# Patient Record
Sex: FEMALE | Race: WHITE | NOT HISPANIC OR LATINO | URBAN - METROPOLITAN AREA
[De-identification: names, ages, dates, MRNs, and addresses within clinical notes are randomized per-mention and may not be internally consistent; named-entity substitution may affect disease eponyms.]

---

## 2017-04-27 ENCOUNTER — EMERGENCY (EMERGENCY)
Facility: HOSPITAL | Age: 72
LOS: 1 days | Discharge: HOME | End: 2017-04-27
Admitting: INTERNAL MEDICINE
Payer: MEDICARE

## 2017-04-27 PROCEDURE — 94640 AIRWAY INHALATION TREATMENT: CPT

## 2017-04-27 PROCEDURE — 99284 EMERGENCY DEPT VISIT MOD MDM: CPT

## 2017-04-27 PROCEDURE — 99284 EMERGENCY DEPT VISIT MOD MDM: CPT | Mod: 25

## 2017-07-28 ENCOUNTER — EMERGENCY (EMERGENCY)
Facility: HOSPITAL | Age: 72
LOS: 1 days | Discharge: HOME | End: 2017-07-28
Admitting: EMERGENCY MEDICINE
Payer: MEDICARE

## 2017-07-28 PROCEDURE — 74176 CT ABD & PELVIS W/O CONTRAST: CPT | Mod: 26

## 2017-07-28 PROCEDURE — 99284 EMERGENCY DEPT VISIT MOD MDM: CPT

## 2017-07-29 PROCEDURE — 85027 COMPLETE CBC AUTOMATED: CPT

## 2017-07-29 PROCEDURE — 85730 THROMBOPLASTIN TIME PARTIAL: CPT

## 2017-07-29 PROCEDURE — 83690 ASSAY OF LIPASE: CPT

## 2017-07-29 PROCEDURE — 80053 COMPREHEN METABOLIC PANEL: CPT

## 2017-07-29 PROCEDURE — 81002 URINALYSIS NONAUTO W/O SCOPE: CPT

## 2017-07-29 PROCEDURE — 99284 EMERGENCY DEPT VISIT MOD MDM: CPT | Mod: 25

## 2017-07-29 PROCEDURE — 85610 PROTHROMBIN TIME: CPT

## 2017-07-29 PROCEDURE — 74176 CT ABD & PELVIS W/O CONTRAST: CPT

## 2017-07-29 PROCEDURE — 81001 URINALYSIS AUTO W/SCOPE: CPT

## 2017-07-29 PROCEDURE — 96374 THER/PROPH/DIAG INJ IV PUSH: CPT

## 2017-07-29 PROCEDURE — 81003 URINALYSIS AUTO W/O SCOPE: CPT

## 2018-01-17 ENCOUNTER — APPOINTMENT (OUTPATIENT)
Dept: ULTRASOUND IMAGING | Facility: HOSPITAL | Age: 73
End: 2018-01-17
Payer: MEDICARE

## 2018-01-17 ENCOUNTER — APPOINTMENT (OUTPATIENT)
Dept: MAMMOGRAPHY | Facility: HOSPITAL | Age: 73
End: 2018-01-17
Payer: MEDICARE

## 2018-01-17 ENCOUNTER — OUTPATIENT (OUTPATIENT)
Dept: OUTPATIENT SERVICES | Facility: HOSPITAL | Age: 73
LOS: 1 days | End: 2018-01-17
Payer: MEDICARE

## 2018-01-17 DIAGNOSIS — Z00.8 ENCOUNTER FOR OTHER GENERAL EXAMINATION: ICD-10-CM

## 2018-01-17 PROCEDURE — 77066 DX MAMMO INCL CAD BI: CPT

## 2018-01-17 PROCEDURE — 77066 DX MAMMO INCL CAD BI: CPT | Mod: 26

## 2018-01-17 PROCEDURE — 76641 ULTRASOUND BREAST COMPLETE: CPT

## 2018-01-17 PROCEDURE — G0279: CPT | Mod: 26

## 2018-01-17 PROCEDURE — G0279: CPT

## 2018-01-17 PROCEDURE — 76641 ULTRASOUND BREAST COMPLETE: CPT | Mod: 26

## 2018-01-28 ENCOUNTER — EMERGENCY (EMERGENCY)
Facility: HOSPITAL | Age: 73
LOS: 1 days | Discharge: HOME | End: 2018-01-28
Admitting: EMERGENCY MEDICINE
Payer: MEDICARE

## 2018-01-28 PROCEDURE — 99283 EMERGENCY DEPT VISIT LOW MDM: CPT

## 2018-01-28 PROCEDURE — 99282 EMERGENCY DEPT VISIT SF MDM: CPT

## 2018-02-10 ENCOUNTER — EMERGENCY (EMERGENCY)
Facility: HOSPITAL | Age: 73
LOS: 1 days | Discharge: HOME | End: 2018-02-10
Admitting: EMERGENCY MEDICINE
Payer: MEDICARE

## 2018-02-10 PROCEDURE — 99284 EMERGENCY DEPT VISIT MOD MDM: CPT | Mod: 25

## 2018-02-10 PROCEDURE — 93010 ELECTROCARDIOGRAM REPORT: CPT

## 2018-02-10 PROCEDURE — 99284 EMERGENCY DEPT VISIT MOD MDM: CPT

## 2018-02-10 PROCEDURE — 71250 CT THORAX DX C-: CPT

## 2018-02-10 PROCEDURE — 80048 BASIC METABOLIC PNL TOTAL CA: CPT

## 2018-02-10 PROCEDURE — 93005 ELECTROCARDIOGRAM TRACING: CPT

## 2018-02-10 PROCEDURE — 85730 THROMBOPLASTIN TIME PARTIAL: CPT

## 2018-02-10 PROCEDURE — 71250 CT THORAX DX C-: CPT | Mod: 26

## 2018-02-10 PROCEDURE — 85027 COMPLETE CBC AUTOMATED: CPT

## 2018-02-10 PROCEDURE — 85610 PROTHROMBIN TIME: CPT

## 2018-06-13 PROBLEM — Z78.9 SOCIAL ALCOHOL USE: Status: ACTIVE | Noted: 2018-06-13

## 2018-06-13 PROBLEM — Z83.3 FAMILY HISTORY OF DIABETES MELLITUS: Status: ACTIVE | Noted: 2018-06-13

## 2018-06-13 PROBLEM — Z82.49 FAMILY HISTORY OF HEART DISEASE: Status: ACTIVE | Noted: 2018-06-13

## 2018-06-13 PROBLEM — Z86.39 HISTORY OF HYPERCHOLESTEROLEMIA: Status: RESOLVED | Noted: 2018-06-13 | Resolved: 2018-06-13

## 2018-06-13 PROBLEM — S89.92XA: Status: RESOLVED | Noted: 2018-06-13 | Resolved: 2018-06-13

## 2018-06-13 PROBLEM — S60.219A: Status: ACTIVE | Noted: 2018-06-13

## 2018-06-19 ENCOUNTER — APPOINTMENT (OUTPATIENT)
Dept: ORTHOPEDIC SURGERY | Facility: CLINIC | Age: 73
End: 2018-06-19
Payer: COMMERCIAL

## 2018-06-19 VITALS
SYSTOLIC BLOOD PRESSURE: 134 MMHG | HEART RATE: 71 BPM | BODY MASS INDEX: 24.92 KG/M2 | WEIGHT: 146 LBS | HEIGHT: 64 IN | DIASTOLIC BLOOD PRESSURE: 90 MMHG

## 2018-06-19 DIAGNOSIS — Z83.3 FAMILY HISTORY OF DIABETES MELLITUS: ICD-10-CM

## 2018-06-19 DIAGNOSIS — S52.123A DISPLACED FRACTURE OF HEAD OF UNSPECIFIED RADIUS, INITIAL ENCOUNTER FOR CLOSED FRACTURE: ICD-10-CM

## 2018-06-19 DIAGNOSIS — S60.219A CONTUSION OF UNSPECIFIED WRIST, INITIAL ENCOUNTER: ICD-10-CM

## 2018-06-19 DIAGNOSIS — S89.92XA UNSPECIFIED INJURY OF LEFT LOWER LEG, INITIAL ENCOUNTER: ICD-10-CM

## 2018-06-19 DIAGNOSIS — Z78.9 OTHER SPECIFIED HEALTH STATUS: ICD-10-CM

## 2018-06-19 DIAGNOSIS — Z87.891 PERSONAL HISTORY OF NICOTINE DEPENDENCE: ICD-10-CM

## 2018-06-19 DIAGNOSIS — Z86.39 PERSONAL HISTORY OF OTHER ENDOCRINE, NUTRITIONAL AND METABOLIC DISEASE: ICD-10-CM

## 2018-06-19 DIAGNOSIS — Z82.49 FAMILY HISTORY OF ISCHEMIC HEART DISEASE AND OTHER DISEASES OF THE CIRCULATORY SYSTEM: ICD-10-CM

## 2018-06-19 PROCEDURE — 99214 OFFICE O/P EST MOD 30 MIN: CPT

## 2018-06-19 PROCEDURE — 73564 X-RAY EXAM KNEE 4 OR MORE: CPT | Mod: LT

## 2018-06-19 PROCEDURE — 73080 X-RAY EXAM OF ELBOW: CPT | Mod: RT

## 2018-07-17 ENCOUNTER — APPOINTMENT (OUTPATIENT)
Dept: ORTHOPEDIC SURGERY | Facility: CLINIC | Age: 73
End: 2018-07-17
Payer: COMMERCIAL

## 2018-07-17 VITALS — HEIGHT: 64.5 IN | WEIGHT: 146 LBS | BODY MASS INDEX: 24.62 KG/M2

## 2018-07-17 PROCEDURE — 99214 OFFICE O/P EST MOD 30 MIN: CPT

## 2018-10-29 ENCOUNTER — APPOINTMENT (OUTPATIENT)
Dept: ORTHOPEDIC SURGERY | Facility: CLINIC | Age: 73
End: 2018-10-29
Payer: COMMERCIAL

## 2018-10-29 VITALS — WEIGHT: 146 LBS | BODY MASS INDEX: 24.62 KG/M2 | HEIGHT: 64.5 IN

## 2018-10-29 DIAGNOSIS — M76.32 ILIOTIBIAL BAND SYNDROME, LEFT LEG: ICD-10-CM

## 2018-10-29 PROCEDURE — 99214 OFFICE O/P EST MOD 30 MIN: CPT | Mod: 25

## 2018-10-29 PROCEDURE — 20610 DRAIN/INJ JOINT/BURSA W/O US: CPT | Mod: 50

## 2019-03-13 ENCOUNTER — APPOINTMENT (OUTPATIENT)
Dept: MAMMOGRAPHY | Facility: HOSPITAL | Age: 74
End: 2019-03-13
Payer: COMMERCIAL

## 2019-03-13 ENCOUNTER — APPOINTMENT (OUTPATIENT)
Dept: ULTRASOUND IMAGING | Facility: HOSPITAL | Age: 74
End: 2019-03-13
Payer: COMMERCIAL

## 2019-03-13 ENCOUNTER — OUTPATIENT (OUTPATIENT)
Dept: OUTPATIENT SERVICES | Facility: HOSPITAL | Age: 74
LOS: 1 days | End: 2019-03-13
Payer: MEDICARE

## 2019-03-13 DIAGNOSIS — Z00.8 ENCOUNTER FOR OTHER GENERAL EXAMINATION: ICD-10-CM

## 2019-03-13 PROCEDURE — 77063 BREAST TOMOSYNTHESIS BI: CPT | Mod: 26

## 2019-03-13 PROCEDURE — 76641 ULTRASOUND BREAST COMPLETE: CPT | Mod: 26

## 2019-03-13 PROCEDURE — 77067 SCR MAMMO BI INCL CAD: CPT

## 2019-03-13 PROCEDURE — 77063 BREAST TOMOSYNTHESIS BI: CPT

## 2019-03-13 PROCEDURE — 77067 SCR MAMMO BI INCL CAD: CPT | Mod: 26

## 2019-03-13 PROCEDURE — 76641 ULTRASOUND BREAST COMPLETE: CPT

## 2019-03-26 ENCOUNTER — APPOINTMENT (OUTPATIENT)
Dept: OPHTHALMOLOGY | Facility: CLINIC | Age: 74
End: 2019-03-26
Payer: COMMERCIAL

## 2019-03-26 DIAGNOSIS — H53.10 UNSPECIFIED SUBJECTIVE VISUAL DISTURBANCES: ICD-10-CM

## 2019-03-26 PROCEDURE — 99204 OFFICE O/P NEW MOD 45 MIN: CPT

## 2019-03-26 PROCEDURE — 92083 EXTENDED VISUAL FIELD XM: CPT

## 2019-03-26 RX ORDER — ERGOCALCIFEROL 1.25 MG/1
1.25 MG CAPSULE, LIQUID FILLED ORAL
Qty: 4 | Refills: 0 | Status: ACTIVE | COMMUNITY
Start: 2019-02-13

## 2019-03-26 RX ORDER — FLUTICASONE PROPIONATE AND SALMETEROL XINAFOATE 115; 21 UG/1; UG/1
115-21 AEROSOL, METERED RESPIRATORY (INHALATION)
Qty: 36 | Refills: 0 | Status: ACTIVE | COMMUNITY
Start: 2019-02-04

## 2019-04-30 ENCOUNTER — APPOINTMENT (OUTPATIENT)
Dept: ORTHOPEDIC SURGERY | Facility: CLINIC | Age: 74
End: 2019-04-30

## 2019-05-29 ENCOUNTER — APPOINTMENT (OUTPATIENT)
Dept: ORTHOPEDIC SURGERY | Facility: CLINIC | Age: 74
End: 2019-05-29

## 2019-05-30 ENCOUNTER — APPOINTMENT (OUTPATIENT)
Dept: ORTHOPEDIC SURGERY | Facility: CLINIC | Age: 74
End: 2019-05-30
Payer: COMMERCIAL

## 2019-05-30 VITALS — SYSTOLIC BLOOD PRESSURE: 138 MMHG | HEART RATE: 69 BPM | DIASTOLIC BLOOD PRESSURE: 83 MMHG

## 2019-05-30 PROCEDURE — 73630 X-RAY EXAM OF FOOT: CPT | Mod: LT

## 2019-05-30 PROCEDURE — 99213 OFFICE O/P EST LOW 20 MIN: CPT

## 2019-05-31 NOTE — PHYSICAL EXAM
[de-identified] : Oriented to time, place, person\par Mood: Normal\par Affect: Normal\par Appearance: Healthy, well appearing, no acute distress.\par Gait: Normal\par Assistive Devices: Cane\par \par Right foot exam\par \par Skin: Clean, dry, intact\par Inspection: No obvious malalignment, no masses, fifth metatarsal swelling, no effusion, mild ecchymosis\par Pulses: 2+ DP/PT pulses\par ROM: FOOT limited eversion\par Painful ROM: No pain full range of motion ankle\par Tenderness: No tenderness over the medial/lateral malleolus, no CFL/ATFL/PTFL pain. No medial malleolus pain, no deltoid ligament pain. No heel pain. No Achilles tenderness. Positive 5th metatarsal pain. No pain to the LisFranc joint. No ttp over the posterior tibial tendon.\par Stability: Negative anterior/posterior drawer.\par Strength: 5/5 ADD/ABD/TA/GS/EHL/FHL/EDL\par Neuro: Sensation in tact to light touch throughout\par \par \par  [de-identified] : \par The following radiographs were ordered and read by me during this patients visit. I reviewed each radiograph in detail with the patient and discussed the findings as highlighted below. \par \par 3 views of the right foot were obtained today that show small avulsion fracture fifth metatarsal. There is no degenerative change seen. There is no gross malalignment. No significant other obvious osseous abnormality, otherwise unremarkable.

## 2019-05-31 NOTE — DISCUSSION/SUMMARY
[de-identified] : 73-year-old female with left fifth metatarsal avulsion fracture\par \par Point tenderness and swelling to the fifth metatarsal consistent with small avulsion fracture seen on x-ray imaging. Discussion was had with patient regarding these findings as well as treatment. Patient voiced understanding and agreement.\par \par Recommendation: We bearing as tolerated in the CAM boot as supplied x4wks. Decreased activity. Local eyes/NSAIDs p.r.n.\par \par Followup 1mo

## 2019-05-31 NOTE — HISTORY OF PRESENT ILLNESS
[de-identified] : 73 year old female presents today with left foot pain 5/27/19. She tripped over a rug and inverted her ankle. She has developed ecchymosis. The pain is constant worse with walking. Taking Tylenol for pain. Reports slight numbness in the bottom of her foot. \par \par The patient's past medical history, past surgical history, medications and allergies were reviewed by me today with the patient and documented accordingly. In addition, the patient's family and social history, which were noncontributory to this visit, were reviewed also.

## 2019-07-01 ENCOUNTER — APPOINTMENT (OUTPATIENT)
Dept: ORTHOPEDIC SURGERY | Facility: CLINIC | Age: 74
End: 2019-07-01
Payer: COMMERCIAL

## 2019-07-01 VITALS
HEART RATE: 72 BPM | BODY MASS INDEX: 24.45 KG/M2 | DIASTOLIC BLOOD PRESSURE: 78 MMHG | HEIGHT: 64.5 IN | WEIGHT: 145 LBS | SYSTOLIC BLOOD PRESSURE: 121 MMHG

## 2019-07-01 DIAGNOSIS — S92.352D DISPLACED FRACTURE OF FIFTH METATARSAL BONE, LEFT FOOT, SUBSEQUENT ENCOUNTER FOR FRACTURE WITH ROUTINE HEALING: ICD-10-CM

## 2019-07-01 PROCEDURE — 99213 OFFICE O/P EST LOW 20 MIN: CPT

## 2019-07-01 PROCEDURE — 73630 X-RAY EXAM OF FOOT: CPT | Mod: LT

## 2019-07-01 NOTE — PHYSICAL EXAM
[de-identified] : Oriented to time, place, person\par Mood: Normal\par Affect: Normal\par Appearance: Healthy, well appearing, no acute distress.\par Gait: Normal\par Assistive Devices: Cane\par \par Right foot exam\par \par Skin: Clean, dry, intact\par Inspection: No obvious malalignment, no masses, no swelling, no effusion, no ecchymosis\par Pulses: 2+ DP/PT pulses\par ROM: Full\par Painful ROM: No pain full range of motion ankle\par Tenderness: No tenderness over the medial/lateral malleolus, no CFL/ATFL/PTFL pain. No medial malleolus pain, no deltoid ligament pain. No heel pain. No Achilles tenderness. no 5th metatarsal pain. No pain to the LisFranc joint. No ttp over the posterior tibial tendon.\par Stability: Negative anterior/posterior drawer.\par Strength: 5/5 ADD/ABD/TA/GS/EHL/FHL/EDL\par Neuro: Sensation in tact to light touch throughout\par \par \par  [de-identified] : \par The following radiographs were ordered and read by me during this patients visit. I reviewed each radiograph in detail with the patient and discussed the findings as highlighted below. \par \par 3 views of the right foot were obtained today that show small healed avulsion fracture fifth metatarsal. There is no degenerative change seen. There is no gross malalignment. No significant other obvious osseous abnormality, otherwise unremarkable.

## 2019-07-01 NOTE — DISCUSSION/SUMMARY
[de-identified] : 74-year-old female with left fifth metatarsal avulsion fracture\par \par X-ray show improved positioning fifth metatarsal fracture. Small avulsion with local degenerative changes. Patient has minimal to no pain, with full range of motion. Recommendation at this time is for progression of weightbearing.\par \par Recommendation: Discontinue CAM -> regular supportive shoe wear, increase activity to tolerance. Ice/NSAIDs.\par \par Home exercise program provided. No formal PT required.\par \par Followup as needed

## 2019-12-24 ENCOUNTER — APPOINTMENT (OUTPATIENT)
Dept: ORTHOPEDIC SURGERY | Facility: CLINIC | Age: 74
End: 2019-12-24
Payer: COMMERCIAL

## 2019-12-24 DIAGNOSIS — S80.02XA CONTUSION OF LEFT KNEE, INITIAL ENCOUNTER: ICD-10-CM

## 2019-12-24 PROCEDURE — 99212 OFFICE O/P EST SF 10 MIN: CPT

## 2019-12-24 PROCEDURE — 73564 X-RAY EXAM KNEE 4 OR MORE: CPT | Mod: LT

## 2019-12-24 NOTE — PHYSICAL EXAM
[de-identified] : Patient is WDWN, alert, and in no acute distress. Breathing is unlabored. She is grossly oriented to person, place, and time. \par \par Left knee: There is tenderness to palpation medially along the MCL. There is localized swelling and ecchymosis. No valgus or valgus instability present on provocative testing. Flexion and extension 5/5.\par Range of motion: Active flexion and extension full. No crepitus.\par Tests and Signs: All tests for stability are normal. \par Strength: flexion and extension 5/5  [de-identified] : AP, lateral, skyline, and tunnel views of the left knee were obtained and revealed no acute fracture or dislocation. There is medial compartment narrowing consistent with osteoarthritis.

## 2019-12-24 NOTE — ADDENDUM
[FreeTextEntry1] : I, Jory Limon wrote this note acting as a scribe for Dr. Chon Rolon on Dec 24, 2019.

## 2019-12-24 NOTE — HISTORY OF PRESENT ILLNESS
[de-identified] : Patient is a 74 year old female who returns c/o left knee pain. She fell ten days ago on 12/14/2019 and landed directly onto the left knee with it an inverted and flexed position. There was immediate pain followed by development of ecchymosis. She was evaluated at [facility] later that same day where x-rays were taken and read as negative for acute fracture or dislocation. Since then, she has been with residual pain in the knee, primarily over the medial aspect. The knee is tender to the touch. She has intermittent pain when she plants the leg.

## 2019-12-24 NOTE — DISCUSSION/SUMMARY
[de-identified] : Walking and physical activity were encouraged. \par Topical analgesics as needed. \par NSAIDs as tolerated. \par An MRI of the left knee may be ordered to evaluate for MCL tear if her symptoms persist or worsen. \par Follow up in 2 weeks as needed.

## 2019-12-24 NOTE — END OF VISIT
[FreeTextEntry3] : I, Chon Rolon MD, ordering physician, have read and attest that all the information, medical decision making and discharge instructions within are true and accurate.

## 2019-12-27 ENCOUNTER — EMERGENCY (EMERGENCY)
Facility: HOSPITAL | Age: 74
LOS: 1 days | Discharge: HOME | End: 2019-12-27
Attending: EMERGENCY MEDICINE | Admitting: INTERNAL MEDICINE
Payer: COMMERCIAL

## 2019-12-27 VITALS
DIASTOLIC BLOOD PRESSURE: 84 MMHG | HEIGHT: 64 IN | HEART RATE: 90 BPM | WEIGHT: 141.98 LBS | TEMPERATURE: 98 F | OXYGEN SATURATION: 97 % | SYSTOLIC BLOOD PRESSURE: 139 MMHG | RESPIRATION RATE: 17 BRPM

## 2019-12-27 PROCEDURE — 99285 EMERGENCY DEPT VISIT HI MDM: CPT | Mod: 25

## 2019-12-27 PROCEDURE — 94640 AIRWAY INHALATION TREATMENT: CPT

## 2019-12-27 PROCEDURE — 99284 EMERGENCY DEPT VISIT MOD MDM: CPT

## 2019-12-27 RX ORDER — IPRATROPIUM/ALBUTEROL SULFATE 18-103MCG
3 AEROSOL WITH ADAPTER (GRAM) INHALATION
Refills: 0 | Status: COMPLETED | OUTPATIENT
Start: 2019-12-27 | End: 2019-12-27

## 2019-12-27 RX ADMIN — Medication 3 MILLILITER(S): at 14:15

## 2019-12-27 RX ADMIN — Medication 3 MILLILITER(S): at 14:38

## 2019-12-27 RX ADMIN — Medication 50 MILLIGRAM(S): at 13:54

## 2019-12-27 RX ADMIN — Medication 3 MILLILITER(S): at 13:56

## 2019-12-27 NOTE — ED ADULT NURSE NOTE - NSIMPLEMENTINTERV_GEN_ALL_ED
Implemented All Fall Risk Interventions:  [place] to call system. Call bell, personal items and telephone within reach. Instruct patient to call for assistance. Room bathroom lighting operational. Non-slip footwear when patient is off stretcher. Physically safe environment: no spills, clutter or unnecessary equipment. Stretcher in lowest position, wheels locked, appropriate side rails in place. Provide visual cue, wrist band, yellow gown, etc. Monitor gait and stability. Monitor for mental status changes and reorient to person, place, and time. Review medications for side effects contributing to fall risk. Reinforce activity limits and safety measures with patient and family.

## 2019-12-27 NOTE — ED PROVIDER NOTE - CLINICAL SUMMARY MEDICAL DECISION MAKING FREE TEXT BOX
75 y/o F with PMH of right lung cancer s/p partial lobectomy 2 yrs ago, asthma, occasional sinusitis presents to the ED with c/o post nasal drip, facial pain x 4 days, similar to her typical sinusitis. Reports she had a URI 1.5 weeks ago, which is improved. She has zpak at home for her sinusitis, which she started 2 days ago. Reports she had mild SOB and wheezing today she did a neb tx at home with some relief, but states she wanted to come to the ER before her asthma escalated. States she recently had a chest CT which was "normal". Denies f/c, CP, n/v, prolonged immobilization, abd pain, sick contacts or all other complaints. PE as noted above, no signs of resp distress on exam. duoneb tx given. Prednisone given. Patient states she feels better, she is stable for dc with PCP f/u

## 2019-12-27 NOTE — ED ADULT TRIAGE NOTE - CHIEF COMPLAINT QUOTE
Pt c/o cold for 1 week, sinus infection for 3 days, today with worsening shortness of breath, nebulizer treatment x 1/2 hour

## 2019-12-27 NOTE — ED PROVIDER NOTE - RESPIRATORY WHEEZES
mild scattered diffuse wheezes. no tripoding, use of accessory muscles or signs of resp distress noted/DIFFUSE

## 2019-12-27 NOTE — ED PROVIDER NOTE - NSFOLLOWUPINSTRUCTIONS_ED_ALL_ED_FT
Follow up with your primary care physician within 2-3 days   Take the prescribed medication as directed   Stay hydrated  Return to the ER if your symptoms worsen or for any other medical emergencies  ***********    Asthma    Asthma is a condition in which the airways tighten and narrow, making it difficult to breath. Asthma episodes, also called asthma attacks, range from minor to life-threatening. Symptoms include wheezing, coughing, chest tightness, or shortness of breath. The diagnosis of asthma is made by a review of your medical history and a physical exam, but may involve additional testing. Asthma cannot be cured, but medicines and lifestyle changes can help control it. Avoid triggers of asthma which may include animal dander, pollen, mold, smoke, air pollutants, etc.     SEEK IMMEDIATE MEDICAL CARE IF YOU HAVE ANY OF THE FOLLOWING SYMPTOMS: worsening of symptoms, shortness of breath at rest, chest pain, bluish discoloration to lips or fingertips, lightheadedness/dizziness, or fever. Follow up with your primary care physician within 2-3 days   Take the prescribed medication as directed. Finish your azithromycin.   Stay hydrated  Return to the ER if your symptoms worsen or for any other medical emergencies  ***********    Asthma    Asthma is a condition in which the airways tighten and narrow, making it difficult to breath. Asthma episodes, also called asthma attacks, range from minor to life-threatening. Symptoms include wheezing, coughing, chest tightness, or shortness of breath. The diagnosis of asthma is made by a review of your medical history and a physical exam, but may involve additional testing. Asthma cannot be cured, but medicines and lifestyle changes can help control it. Avoid triggers of asthma which may include animal dander, pollen, mold, smoke, air pollutants, etc.     SEEK IMMEDIATE MEDICAL CARE IF YOU HAVE ANY OF THE FOLLOWING SYMPTOMS: worsening of symptoms, shortness of breath at rest, chest pain, bluish discoloration to lips or fingertips, lightheadedness/dizziness, or fever.

## 2019-12-27 NOTE — ED PROVIDER NOTE - ATTENDING CONTRIBUTION TO CARE
I personally evaluated the patient. I reviewed the Resident’s or Physician Assistant’s note (as assigned above), and agree with the findings and plan except as documented in my note.  Patient had sinus symptoms- now has wheezing    PE: slight exp wheezing    clear after duoneb    start prednisone

## 2019-12-27 NOTE — ED ADULT NURSE NOTE - CHPI ED NUR SYMPTOMS NEG
no chest pain/no fever/no body aches/no chills/no edema/no headache/no wheezing/no diaphoresis/no hemoptysis

## 2019-12-27 NOTE — ED ADULT NURSE NOTE - OBJECTIVE STATEMENT
Pt arrived to the ER c/o asthma. Pt reports that she had a cold x1 week that turned into a sinus infection and has been taking azithromycin. Pt states PMH of asthma and reported some difficulty breathing. Pt report productive cough with yellow/ green mucus. Pt denies any nausea, vomiting, fever, or chills. Pt denies any chest pain or trouble breathing at this time.

## 2019-12-27 NOTE — ED PROVIDER NOTE - CARE PLAN
Principal Discharge DX:	Mild asthma, unspecified whether complicated, unspecified whether persistent

## 2019-12-27 NOTE — ED PROVIDER NOTE - OBJECTIVE STATEMENT
73 y/o F with PMH of right lung cancer s/p partial lobectomy 2 yrs ago, asthma, occasional sinusitis presents to the ED with c/o post nasal drip, facial pain x 4 days, similar to her typical sinusitis. Reports she had a URI 1.5 weeks ago, which is improved. She has zpak at home for her sinusitis, which she started 2 days ago. Reports she had mild SOB and wheezing today she took d 73 y/o F with PMH of right lung cancer s/p partial lobectomy 2 yrs ago, asthma, occasional sinusitis presents to the ED with c/o post nasal drip, facial pain x 4 days, similar to her typical sinusitis. Reports she had a URI 1.5 weeks ago, which is improved. She has zpak at home for her sinusitis, which she started 2 days ago. Reports she had mild SOB and wheezing today she did a neb tx at home with some relief, but states she wanted to come to the ER before her asthma escalated. States she recently had a chest CT which was "normal". Denies f/c, CP, n/v, prolonged immobilization, abd pain, sick contacts or all other complaints   WALDO Looney

## 2019-12-27 NOTE — ED PROVIDER NOTE - PATIENT PORTAL LINK FT
You can access the FollowMyHealth Patient Portal offered by [facility] by registering at the following website: [email or web address]. By joining [employer] [employer]’s FollowMyHealth portal, you will also be able to view your health information using other applications (apps) compatible with our system.

## 2020-01-01 DIAGNOSIS — R06.02 SHORTNESS OF BREATH: ICD-10-CM

## 2020-01-30 PROBLEM — C34.91 MALIGNANT NEOPLASM OF UNSPECIFIED PART OF RIGHT BRONCHUS OR LUNG: Chronic | Status: ACTIVE | Noted: 2019-12-27

## 2020-01-30 PROBLEM — J45.909 UNSPECIFIED ASTHMA, UNCOMPLICATED: Chronic | Status: ACTIVE | Noted: 2019-12-27

## 2020-03-19 ENCOUNTER — APPOINTMENT (OUTPATIENT)
Dept: ULTRASOUND IMAGING | Facility: HOSPITAL | Age: 75
End: 2020-03-19

## 2020-03-19 ENCOUNTER — APPOINTMENT (OUTPATIENT)
Dept: MAMMOGRAPHY | Facility: HOSPITAL | Age: 75
End: 2020-03-19

## 2020-04-14 NOTE — ED ADULT NURSE NOTE - NEURO WDL
Alert and oriented to person, place and time, memory intact, behavior appropriate to situation, PERRL. 14-Apr-2020 16:48

## 2020-07-16 NOTE — ED PROVIDER NOTE - PMH
Walk in Asthma, unspecified asthma severity, unspecified whether complicated, unspecified whether persistent    Malignant neoplasm of right lung, unspecified part of lung [employer]

## 2020-08-04 ENCOUNTER — APPOINTMENT (OUTPATIENT)
Dept: MAMMOGRAPHY | Facility: HOSPITAL | Age: 75
End: 2020-08-04
Payer: COMMERCIAL

## 2020-08-04 ENCOUNTER — APPOINTMENT (OUTPATIENT)
Dept: ULTRASOUND IMAGING | Facility: HOSPITAL | Age: 75
End: 2020-08-04
Payer: COMMERCIAL

## 2020-08-04 ENCOUNTER — OUTPATIENT (OUTPATIENT)
Dept: OUTPATIENT SERVICES | Facility: HOSPITAL | Age: 75
LOS: 1 days | End: 2020-08-04
Payer: COMMERCIAL

## 2020-08-04 DIAGNOSIS — Z00.8 ENCOUNTER FOR OTHER GENERAL EXAMINATION: ICD-10-CM

## 2020-08-04 PROCEDURE — 76641 ULTRASOUND BREAST COMPLETE: CPT | Mod: 26,50

## 2020-08-04 PROCEDURE — 77063 BREAST TOMOSYNTHESIS BI: CPT

## 2020-08-04 PROCEDURE — 77065 DX MAMMO INCL CAD UNI: CPT | Mod: 26,GG,RT

## 2020-08-04 PROCEDURE — 77067 SCR MAMMO BI INCL CAD: CPT | Mod: 26,59

## 2020-08-04 PROCEDURE — 77063 BREAST TOMOSYNTHESIS BI: CPT | Mod: 26,59

## 2020-08-04 PROCEDURE — 76641 ULTRASOUND BREAST COMPLETE: CPT

## 2020-08-04 PROCEDURE — 77067 SCR MAMMO BI INCL CAD: CPT

## 2020-08-04 PROCEDURE — 77065 DX MAMMO INCL CAD UNI: CPT

## 2020-10-13 ENCOUNTER — APPOINTMENT (OUTPATIENT)
Dept: ORTHOPEDIC SURGERY | Facility: CLINIC | Age: 75
End: 2020-10-13
Payer: MEDICARE

## 2020-10-13 PROCEDURE — 20610 DRAIN/INJ JOINT/BURSA W/O US: CPT | Mod: LT

## 2020-10-13 PROCEDURE — 99214 OFFICE O/P EST MOD 30 MIN: CPT | Mod: 25

## 2020-10-13 PROCEDURE — 73564 X-RAY EXAM KNEE 4 OR MORE: CPT | Mod: 50

## 2020-10-13 NOTE — DISCUSSION/SUMMARY
[de-identified] : Dx. Bursitis \par \par The patient wishes to proceed with a cortisone injection at this time. The skin was prepped with Betadine and alcohol and sprayed with Ethyl Chloride. An injection of 4 cc 1% Lidocaine without epinephrine, 1.0 cc Kenalog 40 mg, and 0.5 cc Dexamethasone was administered into the left knee along the area of maximum tenderness/pes anserina. The patient tolerated the procedure well. She will rest and apply ice. \par \par Walking and physical activity were encouraged. \par Topical analgesics as needed. \par NSAIDs as tolerated. \par An MRI of the left knee may be ordered to evaluate for MCL tear if her symptoms persist or worsen. \par Follow up in 6 weeks as needed.

## 2020-10-13 NOTE — ADDENDUM
[FreeTextEntry1] : I, Misty Cross wrote this note acting as a scribe for Dr. Chon Rolon on Oct 13, 2020.

## 2020-10-13 NOTE — HISTORY OF PRESENT ILLNESS
[de-identified] : Patient is a 74 year old female who returns c/o left knee pain. She fell ten days ago on 12/14/2019 and landed directly onto the left knee with it an inverted and flexed position. There was immediate pain followed by development of ecchymosis. She was evaluated at [facility] later that same day where x-rays were taken and read as negative for acute fracture or dislocation. Since then, she has been with residual pain in the knee, primarily over the medial aspect. The knee is tender to the touch. She has intermittent pain when she plants the leg. She returns stating that the pain in her left knee is getting worse. The pain is in the medial side of the knee.

## 2020-10-13 NOTE — PHYSICAL EXAM
[de-identified] : Patient is WDWN, alert, and in no acute distress. Breathing is unlabored. She is grossly oriented to person, place, and time. \par \par Left knee: There is tenderness to palpation medially along the proximal medial tibia in the area of the pes anserina. There is localized swelling . No  joint tenderness.No valgus or valgus instability present on provocative testing. Flexion and extension 5/5.\par Range of motion: Active flexion and extension full. No crepitus.\par Tests and Signs: All tests for stability are normal. \par Strength: flexion and extension 5/5  [de-identified] : \par \par AP, lateral, skyline, and oblique views of the bilateral knees were obtained and revealed mild  arthritis. \par

## 2020-10-15 ENCOUNTER — APPOINTMENT (OUTPATIENT)
Dept: ORTHOPEDIC SURGERY | Facility: CLINIC | Age: 75
End: 2020-10-15
Payer: MEDICARE

## 2020-10-15 DIAGNOSIS — M25.551 PAIN IN RIGHT HIP: ICD-10-CM

## 2020-10-15 PROCEDURE — 73564 X-RAY EXAM KNEE 4 OR MORE: CPT | Mod: RT

## 2020-10-15 PROCEDURE — 99213 OFFICE O/P EST LOW 20 MIN: CPT

## 2020-10-15 PROCEDURE — 73502 X-RAY EXAM HIP UNI 2-3 VIEWS: CPT | Mod: RT

## 2020-10-15 RX ORDER — ALBUTEROL SULFATE 2.5 MG/3ML
(2.5 MG/3ML) SOLUTION RESPIRATORY (INHALATION)
Qty: 810 | Refills: 0 | Status: ACTIVE | COMMUNITY
Start: 2020-07-07

## 2020-10-15 RX ORDER — ALBUTEROL SULFATE 90 UG/1
108 (90 BASE) AEROSOL, METERED RESPIRATORY (INHALATION)
Qty: 54 | Refills: 0 | Status: ACTIVE | COMMUNITY
Start: 2020-03-25

## 2020-10-15 NOTE — ADDENDUM
[FreeTextEntry1] : I, Misty Cross wrote this note acting as a scribe for Dr. Chon Rolon on Oct 15, 2020.

## 2020-10-15 NOTE — DISCUSSION/SUMMARY
[de-identified] : The patient was reassured that she does not have a fracture on today's Xrays\par Walking and physical activity were encouraged. \par Range of motion and strengthening exercises were reviewed. \par Topical analgesics as needed. \par Patient is advised to use a heating pad. \par Follow Up in 1 weeks if pain is not improved

## 2020-10-15 NOTE — PHYSICAL EXAM
[de-identified] : Patient is WDWN, alert, and in no acute distress. Breathing is unlabored. She is grossly oriented to person, place, and time. \par Right thigh tenderness posteriorly\par Patient is able to flex and extend her right hip and knee without difficulty. [de-identified] : \par \par AP views of the pelvis and lateral of the right hip were obtained and revealed no fracture.There is a small  bone island in the right proximal femoral canal.. \par \par AP, lateral, skyline, and oblique views of the right knee were obtained and revealed mild arthritis. No evidence of a fracture. \par  \par  \par

## 2020-10-26 ENCOUNTER — APPOINTMENT (OUTPATIENT)
Dept: ORTHOPEDIC SURGERY | Facility: CLINIC | Age: 75
End: 2020-10-26
Payer: MEDICARE

## 2020-10-26 DIAGNOSIS — M25.561 PAIN IN RIGHT KNEE: ICD-10-CM

## 2020-10-26 DIAGNOSIS — S82.143A DISPLACED BICONDYLAR FRACTURE OF UNSPECIFIED TIBIA, INITIAL ENCOUNTER FOR CLOSED FRACTURE: ICD-10-CM

## 2020-10-26 PROCEDURE — 99214 OFFICE O/P EST MOD 30 MIN: CPT

## 2020-10-27 ENCOUNTER — OUTPATIENT (OUTPATIENT)
Dept: OUTPATIENT SERVICES | Facility: HOSPITAL | Age: 75
LOS: 1 days | End: 2020-10-27
Payer: MEDICARE

## 2020-10-27 ENCOUNTER — TRANSCRIPTION ENCOUNTER (OUTPATIENT)
Age: 75
End: 2020-10-27

## 2020-10-27 ENCOUNTER — APPOINTMENT (OUTPATIENT)
Dept: MRI IMAGING | Facility: HOSPITAL | Age: 75
End: 2020-10-27
Payer: MEDICARE

## 2020-10-27 DIAGNOSIS — S82.143A DISPLACED BICONDYLAR FRACTURE OF UNSPECIFIED TIBIA, INITIAL ENCOUNTER FOR CLOSED FRACTURE: ICD-10-CM

## 2020-10-27 DIAGNOSIS — M25.561 PAIN IN RIGHT KNEE: ICD-10-CM

## 2020-10-27 PROCEDURE — 73721 MRI JNT OF LWR EXTRE W/O DYE: CPT

## 2020-10-27 PROCEDURE — 73721 MRI JNT OF LWR EXTRE W/O DYE: CPT | Mod: 26,RT

## 2020-10-27 NOTE — PHYSICAL EXAM
[de-identified] : Patient is WDWN, alert, and in no acute distress. Breathing is unlabored. She is grossly oriented to person, place, and time. \par \par Right thigh tenderness posteriorly\par Patient is able to flex and extend her right hip and knee without difficulty. [de-identified] : AP views of the pelvis and lateral of the right hip were obtained and revealed no fracture.There is a small  bone island in the right proximal femoral canal.. \par \par AP, lateral, skyline, and oblique views of the right knee were obtained and revealed mild arthritis. There is a linear lucency along the lateral plateau articular surface which may be a nondisplaced  lateral tibia plateau  fracture. \par  \par  \par

## 2020-10-27 NOTE — ADDENDUM
[FreeTextEntry1] : I, Misty Cross wrote this note acting as a scribe for Dr. Chon Rolon on Oct 26, 2020.  No bruits; no thyromegaly or nodules

## 2020-10-27 NOTE — HISTORY OF PRESENT ILLNESS
[de-identified] : Patient is a 74 year old female who returns c/o right knee pain. She fell ten days ago on 12/14/2019 and landed directly onto the right knee with it an inverted and flexed position. There was immediate pain followed by development of ecchymosis. She was evaluated at [facility] later that same day where x-rays were taken and read as negative for acute fracture or dislocation. Since then, she has been with residual pain in the knee, primarily over the medial aspect. The knee is tender to the touch. She has intermittent pain when she plants the leg. She returns stating that the pain in her right knee is getting worse. The pain is in the medial side of the knee. She returns after falling the previous night 10/14/2020 and experiencing pain in her right knee. She states that the pain occurs  when changing positions and with  WB. She reports that she took a Tylenol this morning. Denies pain with pressure. She is ambulating with a walker. She returns stating that she still has pain on the lateral side of her knee. She states that she has pain with WB and movement.

## 2020-10-27 NOTE — DISCUSSION/SUMMARY
[de-identified] : An MRI of the right knee was ordered to evaluate for occult fracture. Patient will follow-up to review the results. \par Walking and physical activity were encouraged. \par Range of motion and strengthening exercises were reviewed. \par Topical analgesics as needed. \par Patient is advised to use a heating pad. \par

## 2020-11-03 ENCOUNTER — APPOINTMENT (OUTPATIENT)
Dept: ORTHOPEDIC SURGERY | Facility: CLINIC | Age: 75
End: 2020-11-03

## 2020-11-17 ENCOUNTER — APPOINTMENT (OUTPATIENT)
Dept: ORTHOPEDIC SURGERY | Facility: CLINIC | Age: 75
End: 2020-11-17
Payer: MEDICARE

## 2020-11-17 DIAGNOSIS — S70.11XD CONTUSION OF RIGHT THIGH, SUBSEQUENT ENCOUNTER: ICD-10-CM

## 2020-11-17 PROCEDURE — 73552 X-RAY EXAM OF FEMUR 2/>: CPT | Mod: RT

## 2020-11-17 PROCEDURE — 99214 OFFICE O/P EST MOD 30 MIN: CPT

## 2020-11-17 NOTE — ADDENDUM
[FreeTextEntry1] : I, Misty Cross wrote this note acting as a scribe for Dr. Chon Rolon on Nov 17, 2020.

## 2020-11-17 NOTE — DISCUSSION/SUMMARY
[de-identified] : An MRI of the right femur was ordered to evaluate for calcific density in the proximal third femur canal. Patient will follow-up to review the results. \par Walking and physical activity were encouraged. \par Range of motion and strengthening exercises were reviewed. \par Topical analgesics as needed. \par NSAIDs as tolerated. \par

## 2020-11-17 NOTE — HISTORY OF PRESENT ILLNESS
[de-identified] : Patient is a 74 year old female who returns c/o right knee pain. She fell ten days ago on 12/14/2019 and landed directly onto the right knee with it an inverted and flexed position. There was immediate pain followed by development of ecchymosis. She was evaluated at [facility] later that same day where x-rays were taken and read as negative for acute fracture or dislocation. Since then, she has been with residual pain in the knee, primarily over the medial aspect. The knee is tender to the touch. She has intermittent pain when she plants the leg. She returns stating that the pain in her right knee is getting worse. The pain is in the medial side of the knee. She returns after falling the previous night 10/14/2020 and experiencing pain in her right knee. She states that the pain occurs  when changing positions and with  WB. She reports that she took a Tylenol this morning. Denies pain with pressure. She is ambulating with a walker. She returns stating that she still has pain on the lateral side of her knee. She states that she has pain with WB and movement. \par She returns c/o of right femur pain. She states that she was in bed changing positions when she felt the pain on 11/11/2020. She states the pain lasted more than 10 minutes. Denies pain when WB. She states she has noticed numbness on the plantar side of the right foot. Reports taking Advil 200mg, she states that she had to take 8 200mg Asdvils in a day.

## 2020-11-17 NOTE — PHYSICAL EXAM
[de-identified] : Patient is WDWN, alert, and in no acute distress. Breathing is unlabored. She is grossly oriented to person, place, and time. \par \par Right thigh tenderness posteriorly\par Patient is able to flex and extend her right hip and knee without difficulty. [de-identified] : AP views of the pelvis and lateral of the right hip were obtained and revealed no fracture.There is a small  bone island in the right proximal femoral canal.. \par \par AP, lateral, skyline, and oblique views of the right knee were obtained and revealed mild arthritis. There is a linear lucency along the lateral plateau articular surface which may be a nondisplaced  lateral tibia plateau  fracture. \par  \par AP, lateral and oblique views of the right femur were obtained and revealed no acute fracture. No dislocation. Cartilage spaces are maintained. There is a calcific deposit in the proximal femur canal. There is evidence of arthritis in the knee. \par  \par

## 2020-11-18 ENCOUNTER — APPOINTMENT (OUTPATIENT)
Dept: MRI IMAGING | Facility: HOSPITAL | Age: 75
End: 2020-11-18
Payer: MEDICARE

## 2020-11-18 ENCOUNTER — OUTPATIENT (OUTPATIENT)
Dept: OUTPATIENT SERVICES | Facility: HOSPITAL | Age: 75
LOS: 1 days | End: 2020-11-18
Payer: MEDICARE

## 2020-11-18 ENCOUNTER — RESULT REVIEW (OUTPATIENT)
Age: 75
End: 2020-11-18
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
## 2020-11-18 DIAGNOSIS — M79.651 PAIN IN RIGHT THIGH: ICD-10-CM

## 2020-11-18 DIAGNOSIS — S70.11XD CONTUSION OF RIGHT THIGH, SUBSEQUENT ENCOUNTER: ICD-10-CM

## 2020-11-18 PROCEDURE — 73718 MRI LOWER EXTREMITY W/O DYE: CPT | Mod: 26,RT

## 2020-11-18 PROCEDURE — 73718 MRI LOWER EXTREMITY W/O DYE: CPT

## 2020-11-24 ENCOUNTER — APPOINTMENT (OUTPATIENT)
Dept: ORTHOPEDIC SURGERY | Facility: CLINIC | Age: 75
End: 2020-11-24

## 2021-02-23 ENCOUNTER — APPOINTMENT (OUTPATIENT)
Dept: ORTHOPEDIC SURGERY | Facility: CLINIC | Age: 76
End: 2021-02-23
Payer: COMMERCIAL

## 2021-02-23 DIAGNOSIS — M17.11 UNILATERAL PRIMARY OSTEOARTHRITIS, RIGHT KNEE: ICD-10-CM

## 2021-02-23 PROCEDURE — 99072 ADDL SUPL MATRL&STAF TM PHE: CPT

## 2021-02-23 PROCEDURE — 99214 OFFICE O/P EST MOD 30 MIN: CPT

## 2021-02-23 PROCEDURE — 73564 X-RAY EXAM KNEE 4 OR MORE: CPT | Mod: RT

## 2021-02-23 NOTE — ADDENDUM
[FreeTextEntry1] : I, Cheo Juan R wrote this note acting as a scribe for Dr. Chon Rolon MD on Feb 23, 2021.\par \par All medical record entries made by the Scribe were at my,  Dr. Chon Rolon MD., direction and personally dictated by me on 02/23/2021. I have personally reviewed the chart and agree that the record accurately reflects my personal performance of the history, physical exam, assessment and plan.

## 2021-02-23 NOTE — DISCUSSION/SUMMARY
[de-identified] : The underlying pathophysiology was reviewed with the patient. XR films were reviewed with the patient. Discussed at length the nature of the patient’s condition. Their right knee symptoms appear secondary to mild degenerative arthritis, calcium deposits\par \par Treatment options were discussed including; observation, NSAIDS, analgesics, bracing, injection(s), physical therapy.\par \par The patient wishes to proceed with physical therapy. A script was given.\par Advil or Aleve prn 	\par Recommend OTC Voltaren gel.\par \par Patient can continue activities as tolerated. All questions answered, understanding verbalized. Patient in agreement with plan of care. Patient may follow up prn.

## 2021-02-23 NOTE — PHYSICAL EXAM
[de-identified] : Patient is WDWN, alert, and in no acute distress. Breathing is unlabored. She is grossly oriented to person, place, and time. \par \par Right knee: There is no medial/lateral joint line tenderness to palpation. No swelling, no valgus or valgus instability \par present on provocative testing. \par Range of motion: Active flexion and extension are full and painless. No crepitus.\par Tests and Signs: All tests for stability are normal. \par Strength: flexion and extension 5/5		  [de-identified] : Right knee xrays, standing AP/Lateral and Merchant films, and 45 degree PA standing view, taken at the office today shows diffuse tricompartmental degenerative arthritis, medial joint space narrowing, marginal osteophytes, sclerosis, patellofemoral joint space narrowing, peripheral osteophytes.

## 2021-02-23 NOTE — HISTORY OF PRESENT ILLNESS
[de-identified] : JESÚS POST is a 75 year female presents for follow-up evaluation of right knee pain. She reports pain in the morning, walking down the stairs, pivoting motion. She would like PT. She has been doing home exercises but does not think she is benefiting. She has bee taking Advil occasionally for the pain.

## 2021-03-13 ENCOUNTER — EMERGENCY (EMERGENCY)
Facility: HOSPITAL | Age: 76
LOS: 1 days | Discharge: HOME | End: 2021-03-13
Attending: EMERGENCY MEDICINE | Admitting: EMERGENCY MEDICINE
Payer: MEDICARE

## 2021-03-13 VITALS
HEART RATE: 89 BPM | SYSTOLIC BLOOD PRESSURE: 158 MMHG | WEIGHT: 139.99 LBS | DIASTOLIC BLOOD PRESSURE: 90 MMHG | HEIGHT: 64 IN | RESPIRATION RATE: 16 BRPM | TEMPERATURE: 97 F | OXYGEN SATURATION: 95 %

## 2021-03-13 PROCEDURE — 73590 X-RAY EXAM OF LOWER LEG: CPT | Mod: 26,RT

## 2021-03-13 PROCEDURE — 73590 X-RAY EXAM OF LOWER LEG: CPT

## 2021-03-13 PROCEDURE — 12002 RPR S/N/AX/GEN/TRNK2.6-7.5CM: CPT

## 2021-03-13 PROCEDURE — 99283 EMERGENCY DEPT VISIT LOW MDM: CPT | Mod: 25

## 2021-03-13 RX ORDER — ACETAMINOPHEN 500 MG
650 TABLET ORAL ONCE
Refills: 0 | Status: COMPLETED | OUTPATIENT
Start: 2021-03-13 | End: 2021-03-13

## 2021-03-13 RX ADMIN — Medication 650 MILLIGRAM(S): at 16:38

## 2021-03-13 NOTE — ED PROCEDURE NOTE - ATTENDING CONTRIBUTION TO CARE
Dr. Escalona: I performed a face to face bedside interview with patient regarding history of present illness, review of symptoms and past medical history. I completed an independent physical exam.  I have discussed patient's plan of care with PA.   I agree with note as stated above, having amended the EMR as needed to reflect my findings.   This includes HISTORY OF PRESENT ILLNESS, HIV, PAST MEDICAL/SURGICAL/FAMILY/SOCIAL HISTORY, ALLERGIES AND HOME MEDICATIONS, REVIEW OF SYSTEMS, PHYSICAL EXAM, and any PROGRESS NOTES during the time I functioned as the attending physician for this patient.

## 2021-03-13 NOTE — ED PROVIDER NOTE - PMH
Asthma, unspecified asthma severity, unspecified whether complicated, unspecified whether persistent    Malignant neoplasm of right lung, unspecified part of lung

## 2021-03-13 NOTE — ED ADULT NURSE NOTE - OBJECTIVE STATEMENT
Patient presents to ED with complaints of right shin laceration after walking into  door. Patient ambulatory, denies other injuries at this time.

## 2021-03-13 NOTE — ED PROVIDER NOTE - IV ALTEPLASE EXCL ABS HIDDEN
October 16, 2018                   [facility]  Pediatrics  [address]  [place] [address]  Phone: [phone]  Fax: [phone]   October 16, 2018     Patient: Monet Charles   YOB: 2010   Date of Visit: 10/16/2018       To Whom it May Concern:    Monet Charles was seen in my clinic on 10/16/2018. She may return to school on 10/17/18.    If you have any questions or concerns, please don't hesitate to call.    Sincerely,         Aury Harley MD     
show

## 2021-03-13 NOTE — ED PROVIDER NOTE - OBJECTIVE STATEMENT
75F h/o lung ca in the past, tdap utd, bumped her right lower leg into the edge of a  prior to arrival today and sustained a lac. No other injuries. Able to ambulate.

## 2021-03-13 NOTE — ED PROVIDER NOTE - PATIENT PORTAL LINK FT
You can access the FollowMyHealth Patient Portal offered by [facility] by registering at the following website: [email or web address]. By joining [employer]’s FollowMyHealth portal, you will also be able to view your health information using other applications (apps) compatible with our system.

## 2021-03-13 NOTE — ED PROVIDER NOTE - ATTENDING CONTRIBUTION TO CARE
Dr. Escalona: I performed a face to face bedside interview with patient regarding history of present illness, review of symptoms and past medical history. I completed an independent physical exam.  I have discussed patient's plan of care with PA.   I agree with note as stated above, having amended the EMR as needed to reflect my findings.   This includes HISTORY OF PRESENT ILLNESS, HIV, PAST MEDICAL/SURGICAL/FAMILY/SOCIAL HISTORY, ALLERGIES AND HOME MEDICATIONS, REVIEW OF SYSTEMS, PHYSICAL EXAM, and any PROGRESS NOTES during the time I functioned as the attending physician for this patient.    Dr. Escalona: This H&P has been written by myself in its entirety

## 2021-03-13 NOTE — ED PROVIDER NOTE - PROGRESS NOTE DETAILS
APRIL Kumari: Seen pt with Dr. Escalona. Agree with above HPI, PE, a/p. sutures placed, pt tolerated with no complaints. xray reviewed. Pt stable for dc and fu in 7 days for suture removal.

## 2021-03-21 ENCOUNTER — EMERGENCY (EMERGENCY)
Facility: HOSPITAL | Age: 76
LOS: 1 days | Discharge: HOME | End: 2021-03-21
Attending: EMERGENCY MEDICINE | Admitting: EMERGENCY MEDICINE
Payer: MEDICARE

## 2021-03-21 VITALS
TEMPERATURE: 98 F | OXYGEN SATURATION: 98 % | WEIGHT: 138.01 LBS | HEIGHT: 64 IN | SYSTOLIC BLOOD PRESSURE: 144 MMHG | HEART RATE: 74 BPM | RESPIRATION RATE: 18 BRPM | DIASTOLIC BLOOD PRESSURE: 80 MMHG

## 2021-03-21 PROCEDURE — L9995: CPT

## 2021-03-21 PROCEDURE — G0463: CPT

## 2021-03-21 NOTE — ED PROVIDER NOTE - CLINICAL SUMMARY MEDICAL DECISION MAKING FREE TEXT BOX
pt with right lower leg laceration, sutured 1 week ago. no pain, no d/c no fever no chills, healing. presents for suture removal    4 removed, 2 left

## 2021-03-21 NOTE — ED PROVIDER NOTE - NSFOLLOWUPINSTRUCTIONS_ED_ALL_ED_FT
Stitches Removal    WHAT YOU NEED TO KNOW:    What do I need to know about stitches removal? Stitches are usually removed within 14 days, depending on the location of the wound. Your healthcare provider will tell you when to return to have your stitches removed. Your provider will use sterile forceps or tweezers to  the knot of each stitch. He or she will cut the stitch with scissors and pull the stitch out. You may feel a slight tug as the stitch comes out.    What can I do to care for the area after the stitches are removed?   •Do not pull medical tape off. Your provider may place small strips of medical tape across your wound after the stitches have been removed. These strips will peel and fall of on their own. Do not pull them off.      •Clean the area as directed. Carefully wash the area with soap and water. Pat the area dry with a clean towel. Check the area for signs of infection, such as redness, swelling, or pus. Also check that the wound is not coming apart.      •Protect your wound. Your wound can swell, bleed, or split open if it is stretched or bumped. You may need to wear a bandage that supports your wound until it is completely healed.      •Care for a scar. You may have a scar after the stitches are removed. Use sunblock if the area is exposed to the sun. Apply it every day after the stitches are removed. This will help prevent skin discoloration. Talk to your healthcare provider about medicines you can use to make the scar less visible. Some medicines are available without a prescription.      When should I seek immediate care?   •Your wound splits open or is starting to come apart.      •You suddenly cannot move your injured joint.      •You have sudden numbness around your wound.      •You see red streaks coming from your wound.      When should I contact my healthcare provider?   •You have a fever and chills.      •Your wound is red, warm, swollen, or leaking pus.      •There is a bad smell coming from your wound.      •You have increased pain in the wound area.      •You have questions or concerns about your condition or care.      CARE AGREEMENT:    You have the right to help plan your care. Learn about your health condition and how it may be treated. Discuss treatment options with your healthcare providers to decide what care you want to receive. You always have the right to refuse treatment.     Return to ER for suture removal in 3 days.  Return if fever, worse pain, swelling or other symptoms.

## 2021-03-21 NOTE — ED PROVIDER NOTE - OBJECTIVE STATEMENT
pt with right lower leg laceration, sutured 1 week ago. no pain, no d/c no fever no chills, healing. presents for suture removal

## 2021-03-21 NOTE — ED PROVIDER NOTE - PROGRESS NOTE DETAILS
2 sutures left in place  [place] going to [place] 3[place], will go to er/urg care in [place] for removal on 3/23

## 2021-03-21 NOTE — ED PROVIDER NOTE - PATIENT PORTAL LINK FT
You can access the FollowMyHealth Patient Portal offered by [facility] by registering at the following website: [email or web address]. By joining [employer]’s FollowMyHealth portal, you will also be able to view your health information using other applications (apps) compatible with our system.

## 2021-08-05 ENCOUNTER — APPOINTMENT (OUTPATIENT)
Dept: ULTRASOUND IMAGING | Facility: HOSPITAL | Age: 76
End: 2021-08-05
Payer: MEDICARE

## 2021-08-05 ENCOUNTER — OUTPATIENT (OUTPATIENT)
Dept: OUTPATIENT SERVICES | Facility: HOSPITAL | Age: 76
LOS: 1 days | End: 2021-08-05
Payer: MEDICARE

## 2021-08-05 ENCOUNTER — APPOINTMENT (OUTPATIENT)
Dept: MAMMOGRAPHY | Facility: HOSPITAL | Age: 76
End: 2021-08-05
Payer: MEDICARE

## 2021-08-05 DIAGNOSIS — Z00.8 ENCOUNTER FOR OTHER GENERAL EXAMINATION: ICD-10-CM

## 2021-08-05 PROCEDURE — 76641 ULTRASOUND BREAST COMPLETE: CPT

## 2021-08-05 PROCEDURE — 77063 BREAST TOMOSYNTHESIS BI: CPT

## 2021-08-05 PROCEDURE — 77067 SCR MAMMO BI INCL CAD: CPT

## 2021-08-05 PROCEDURE — 76641 ULTRASOUND BREAST COMPLETE: CPT | Mod: 26,50

## 2021-08-05 PROCEDURE — 77063 BREAST TOMOSYNTHESIS BI: CPT | Mod: 26

## 2021-08-05 PROCEDURE — 77067 SCR MAMMO BI INCL CAD: CPT | Mod: 26

## 2021-09-22 ENCOUNTER — EMERGENCY (EMERGENCY)
Facility: HOSPITAL | Age: 76
LOS: 1 days | Discharge: HOME | End: 2021-09-22
Attending: INTERNAL MEDICINE | Admitting: INTERNAL MEDICINE
Payer: MEDICARE

## 2021-09-22 VITALS
DIASTOLIC BLOOD PRESSURE: 80 MMHG | RESPIRATION RATE: 16 BRPM | TEMPERATURE: 98 F | SYSTOLIC BLOOD PRESSURE: 149 MMHG | OXYGEN SATURATION: 97 % | WEIGHT: 138.89 LBS | HEIGHT: 64 IN | HEART RATE: 90 BPM

## 2021-09-22 PROCEDURE — 99283 EMERGENCY DEPT VISIT LOW MDM: CPT | Mod: 25

## 2021-09-22 PROCEDURE — 12002 RPR S/N/AX/GEN/TRNK2.6-7.5CM: CPT

## 2021-09-22 RX ORDER — CEPHALEXIN 500 MG
1 CAPSULE ORAL
Qty: 14 | Refills: 0
Start: 2021-09-22 | End: 2021-09-28

## 2021-09-22 RX ORDER — ACETAMINOPHEN 500 MG
650 TABLET ORAL ONCE
Refills: 0 | Status: COMPLETED | OUTPATIENT
Start: 2021-09-22 | End: 2021-09-22

## 2021-09-22 RX ORDER — CEPHALEXIN 500 MG
500 CAPSULE ORAL ONCE
Refills: 0 | Status: COMPLETED | OUTPATIENT
Start: 2021-09-22 | End: 2021-09-22

## 2021-09-22 RX ADMIN — Medication 500 MILLIGRAM(S): at 12:55

## 2021-09-22 RX ADMIN — Medication 650 MILLIGRAM(S): at 12:05

## 2021-09-22 NOTE — ED PROVIDER NOTE - ATTENDING CONTRIBUTION TO CARE
pt 76y f c/o c/o lac to right LE after bumping her leg into a  corner. same  thing happened 6 months ago. tdap utd. pt worried because she ended up with cellulitis and wants abx  denies numbness, tingling, difficulty walking, weakness. no other injuries. wants tylenol. didn't take any meds at home  Skin:  RLE: anterior shin 3.5 cm jagged laceration through skin. no bleeding  Ext:   no deformities, from, sensation intact, soft compartments, cap refill <3  lac repairs. abx and dc home  Dr. Ryan:  I have reviewed and discussed with the PA/ resident the case specifics, including the history, physical assessment, evaluation, conclusion, laboratory results, and medical plan. I agree with the contents, and conclusions. I have personally examined, and interviewed the patient.

## 2021-09-22 NOTE — CHART NOTE - NSCHARTNOTEFT_GEN_A_CORE
SW met with patient at bedside to assess for social work needs and to complete home assessment of safety.  Patient is a 76 year old female presenting to ED with a laceration.  Patient reports that she lives with her spouse. Patient reports that she cut her leg on the corner of her .  Patient denies any other recent falls and reports that she is normally independent.  Patient reports that she does not use any DME to assist with ambulating.  Patient reports that she is independent with her ADLs.  Patient reports that her spouse is currently in [place] but can assist as needed once arriving back.  Patient confirms she has other support if needed, but feels confident that she will be safe at home.  SW offered to schedule PMD follow up, but patient declined stating she will come back for suture removal.  Patient denied any safety concerns.  Patient denied needing any home based/community based referrals.    Patient cell - [phone]

## 2021-09-22 NOTE — ED ADULT NURSE NOTE - NSICDXPASTMEDICALHX_GEN_ALL_CORE_FT
PAST MEDICAL HISTORY:  Asthma, unspecified asthma severity, unspecified whether complicated, unspecified whether persistent     Malignant neoplasm of right lung, unspecified part of lung

## 2021-09-22 NOTE — ED PROVIDER NOTE - PATIENT PORTAL LINK FT
You can access the FollowMyHealth Patient Portal offered by [facility] by registering at the following website: [email or web address]. By joining [employer]’s FollowMyHealth portal, you will also be able to view your health information using other applications (apps) compatible with our system.

## 2021-09-22 NOTE — ED PROVIDER NOTE - PHYSICAL EXAMINATION
Gen: Well appearing in NAD  Head: NC/AT  Neck: trachea midline  Resp:  No distress  Neuro:  A&O appears non focal  Skin:  3.5 cm jagged laceration through skin. no bleeding  Psych:  Normal affect and mood   Ext:   no deformities, from, sensation intact, soft compartments, cap refill <3  Neuro: AAOx3, no sensory/motor deficits Gen: Well appearing in NAD  Head: NC/AT  Neck: trachea midline  Resp:  No distress  Neuro:  A&O appears non focal  Skin:  RLE: anterior shin 3.5 cm jagged laceration through skin. no bleeding  Psych:  Normal affect and mood   Ext:   no deformities, from, sensation intact, soft compartments, cap refill <3  Neuro: AAOx3, no sensory/motor deficits

## 2021-09-22 NOTE — ED PROVIDER NOTE - OBJECTIVE STATEMENT
pt 76y f c/o c/o lac to right LE after bumping her leg into a  corner. same  thing happened 6 months ago. tdap utd. pt worried because she ended up with cellulitis and wants abx  denies numbness, tingling, difficulty walking, weakness. no other injuries. wants tylenol. didn't take any meds at home

## 2021-09-22 NOTE — ED PROVIDER NOTE - CLINICAL SUMMARY MEDICAL DECISION MAKING FREE TEXT BOX
pt 76y f c/o c/o lac to right LE after bumping her leg into a  corner. same  thing happened 6 months ago. tdap utd. pt worried because she ended up with cellulitis and wants abx  denies numbness, tingling, difficulty walking, weakness. no other injuries. wants tylenol. didn't take any meds at home  Skin:  3.5 cm jagged laceration through skin. no bleeding  Ext:   no deformities, from, sensation intact, soft compartments, cap refill <3  lac repairs. abx and dc home pt 76y f c/o c/o lac to right LE after bumping her leg into a  corner. same  thing happened 6 months ago. tdap utd. pt worried because she ended up with cellulitis and wants abx  denies numbness, tingling, difficulty walking, weakness. no other injuries. wants tylenol. didn't take any meds at home  Skin:  RLE: anterior shin 3.5 cm jagged laceration through skin. no bleeding  Ext:   no deformities, from, sensation intact, soft compartments, cap refill <3  lac repairs. abx and dc home

## 2021-09-22 NOTE — ED PROVIDER NOTE - NSFOLLOWUPINSTRUCTIONS_ED_ALL_ED_FT
Laceration    A laceration is a cut that goes through all of the layers of the skin and into the tissue that is right under the skin. Some lacerations heal on their own. Others need to be closed with skin adhesive strips, skin glue, stitches (sutures), or staples. Proper laceration care minimizes the risk of infection and helps the laceration to heal better.  If non-absorbable stitches or staples have been placed, they must be taken out within the time frame instructed by your healthcare provider.    SEEK IMMEDIATE MEDICAL CARE IF YOU HAVE ANY OF THE FOLLOWING SYMPTOMS: swelling around the wound, worsening pain, drainage from the wound, red streaking going away from your wound, inability to move finger or toe near the laceration, or discoloration of skin near the laceration.       Follow up with your primary care provider within 48-72 hours for wound check. Keep sutures covered and dry for 24 hours then clean with soap and water daily.  Apply bacitracin and cover.  Return to ED for suture removal 10 days. Any increased pain, redness, streaking (red lines), swelling, fever, chills return to ER.

## 2021-09-27 NOTE — CHART NOTE - NSCHARTNOTEFT_GEN_A_CORE
Pt presented to ED on 9/22/21 with lacerations. SW placed call to pt to discuss and assist with follow up care. No answer, unable to leave message as mailbox was full. Chart reviewed, on day of ED visit, SW offered to schedule appointment with PCP, pt declined and stated she would return to ED for suture removal.

## 2021-10-02 ENCOUNTER — EMERGENCY (EMERGENCY)
Facility: HOSPITAL | Age: 76
LOS: 1 days | Discharge: HOME | End: 2021-10-02
Attending: EMERGENCY MEDICINE | Admitting: EMERGENCY MEDICINE
Payer: MEDICARE

## 2021-10-02 VITALS
WEIGHT: 139.55 LBS | HEIGHT: 64 IN | RESPIRATION RATE: 16 BRPM | DIASTOLIC BLOOD PRESSURE: 82 MMHG | TEMPERATURE: 98 F | SYSTOLIC BLOOD PRESSURE: 132 MMHG | HEART RATE: 83 BPM | OXYGEN SATURATION: 97 %

## 2021-10-02 PROCEDURE — L9995: CPT

## 2021-10-02 PROCEDURE — 99281 EMR DPT VST MAYX REQ PHY/QHP: CPT

## 2021-10-02 NOTE — ED PROVIDER NOTE - PATIENT PORTAL LINK FT
You can access the FollowMyHealth Patient Portal offered by [facility] by registering at the following website: [email or web address]. By joining [employer] [facility] [employer] [facility]’s FollowMyHealth portal, you will also be able to view your health information using other applications (apps) compatible with our system.

## 2021-10-02 NOTE — ED PROVIDER NOTE - ATTENDING CONTRIBUTION TO CARE
Dr. Escalona: I performed a face to face bedside interview with patient regarding history of present illness, review of symptoms and past medical history. I completed an independent physical exam.  I have discussed patient's plan of care with student. I agree with note as stated above, having amended the EMR as needed to reflect my findings.   This includes HISTORY OF PRESENT ILLNESS, HIV, PAST MEDICAL/SURGICAL/FAMILY/SOCIAL HISTORY, ALLERGIES AND HOME MEDICATIONS, REVIEW OF SYSTEMS, PHYSICAL EXAM, and any PROGRESS NOTES during the time I functioned as the attending physician for this patient.    Dr. Escalona: This H&P has been written by myself in its entirety

## 2021-10-02 NOTE — ED ADULT TRIAGE NOTE - IDEAL BODY WEIGHT(KG)
Pt states that he has had chest pressure for the past couple of  Days. Today he has been belching a lot  And that brought him in . He has had a heart attack in 2015, and had a stent placed.  
55

## 2021-10-02 NOTE — ED PROVIDER NOTE - SKIN COLOR
sutures in place right distal anterior c/d/i with minimal incisional erythema, no warmth, non tender, no drainage

## 2021-10-02 NOTE — ED PROVIDER NOTE - OBJECTIVE STATEMENT
75yo  female presents for suture removal. States 10 days ago she struck her right shin against the corner of the , lacerating her shin. Had sutures placed here and is here for removal only. No other complaints. Has been compliant w abx and tetanus was UTD. Dr. Escalona: 76F presents for suture removal. States 10 days ago she struck her right shin against the corner of the , lacerating her shin. Had sutures placed here and is here for removal only. No other complaints. Has been compliant w abx and tetanus was UTD.

## 2021-10-02 NOTE — ED PROVIDER NOTE - NSFOLLOWUPINSTRUCTIONS_ED_ALL_ED_FT
Stitches Removal    WHAT YOU NEED TO KNOW:    Stitches are usually removed within 14 days, depending on the location of the wound. Your healthcare provider will tell you when to return to have your stitches removed. Your provider will use sterile forceps or tweezers to  the knot of each stitch. He or she will cut the stitch with scissors and pull the stitch out. You may feel a slight tug as the stitch comes out.    DISCHARGE INSTRUCTIONS:    Seek care immediately if:   •Your wound splits open or is starting to come apart.      •You suddenly cannot move your injured joint.      •You have sudden numbness around your wound.      •You see red streaks coming from your wound.      Contact your healthcare provider if:   •You have a fever and chills.      •Your wound is red, warm, swollen, or leaking pus.      •There is a bad smell coming from your wound.      •You have increased pain in the wound area.      •You have questions or concerns about your condition or care.      Care for the area after the stitches are removed:   •Do not pull medical tape off. Your provider may place small strips of medical tape across your wound after the stitches have been removed. These strips will peel and fall of on their own. Do not pull them off.      •Clean the area as directed. Carefully wash the area with soap and water. Pat the area dry with a clean towel. Check the area for signs of infection, such as redness, swelling, or pus. Also check that the wound is not coming apart.      •Protect your wound. Your wound can swell, bleed, or split open if it is stretched or bumped. You may need to wear a bandage that supports your wound until it is completely healed.      •Care for a scar. You may have a scar after the stitches are removed. Use sunblock if the area is exposed to the sun. Apply it every day after the stitches are removed. This will help prevent skin discoloration. Talk to your healthcare provider about medicines you can use to make the scar less visible. Some medicines are available without a prescription.      Follow up with your healthcare provider as directed: You may need to return in 3 to 5 days if the stitches are on your face. Stitches on your scalp need to be removed after 7 to 14 days. Stitches over joints may remain in place up to 14 days. Write down your questions so you remember to ask them during your visits.

## 2022-01-06 ENCOUNTER — OUTPATIENT (OUTPATIENT)
Dept: OUTPATIENT SERVICES | Facility: HOSPITAL | Age: 77
LOS: 1 days | End: 2022-01-06
Payer: MEDICARE

## 2022-01-06 DIAGNOSIS — Z20.828 CONTACT WITH AND (SUSPECTED) EXPOSURE TO OTHER VIRAL COMMUNICABLE DISEASES: ICD-10-CM
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
## 2022-01-06 PROCEDURE — U0003: CPT

## 2022-01-06 PROCEDURE — U0005: CPT

## 2022-05-10 ENCOUNTER — APPOINTMENT (OUTPATIENT)
Dept: ORTHOPEDIC SURGERY | Facility: CLINIC | Age: 77
End: 2022-05-10
Payer: MEDICARE

## 2022-05-10 DIAGNOSIS — M17.12 UNILATERAL PRIMARY OSTEOARTHRITIS, LEFT KNEE: ICD-10-CM

## 2022-05-10 DIAGNOSIS — S49.91XA UNSPECIFIED INJURY OF RIGHT SHOULDER AND UPPER ARM, INITIAL ENCOUNTER: ICD-10-CM

## 2022-05-10 PROCEDURE — 99213 OFFICE O/P EST LOW 20 MIN: CPT | Mod: 25

## 2022-05-10 PROCEDURE — 73564 X-RAY EXAM KNEE 4 OR MORE: CPT | Mod: LT

## 2022-05-10 PROCEDURE — 20610 DRAIN/INJ JOINT/BURSA W/O US: CPT | Mod: 59,RT

## 2022-05-10 PROCEDURE — 73030 X-RAY EXAM OF SHOULDER: CPT | Mod: RT

## 2022-05-16 NOTE — DISCUSSION/SUMMARY
[de-identified] : The underlying pathophysiology was reviewed with the patient. XR films were reviewed with the patient. Discussed at length the nature of the patient’s condition. The right shoulder symptoms appear secondary to bursitis/tendinitis. The left knee symptoms are secondary to osteoarthritis.\par \par With regard to the right shoulder, I have recommended a cortisone injection. She may also use topical analgesics and oral antiinflammatories as needed. \par The patient wishes to proceed with a cortisone injection today. Under sterile precautions, an injection of 5cc 1% lidocaine without epinephrine and 0.5cc Kenalog 40mg, 0.5cc Dexamethasone was administered into the RIGHT posterior subacromial joint space. The patient tolerated the procedure well. Rest and apply ice. \par \par With regard to the left knee, we discussed treatment options of a cortisone injection, as well as use of topical and oral antiinflammatories.\par The patient wishes to proceed with a cortisone injection today. Under sterile precautions, an injection of 5cc 1% lidocaine without epinephrine and 0.5cc Kenalog 40mg, 0.5cc Dexamethasone was administered into the LEFT knee. The patient tolerated the procedure well. \par \par All questions answered, understanding verbalized. Patient in agreement with plan of care. Follow up as needed.

## 2022-05-16 NOTE — HISTORY OF PRESENT ILLNESS
[de-identified] : Patient is a 76 year old female who presents with complaints of right shoulder pain which began about 2 to 3 weeks ago at which time she was gardening. She reports to have been pulling on her hose and doing other work outdoors which aggravated the shoulder. She initially thought she may have sprained it but the pain has persisted. She has full motion to the shoulder with some pain. She has been taking Ibuprofen for pain as needed.\par \par She additionally has a history of left knee pain which dates back years ago secondary to an injury. She states this occasionally flares up and is at times treated with cortisone injections. She would like a cortisone injection today.

## 2022-05-16 NOTE — PHYSICAL EXAM
[de-identified] : Patient is WDWN, alert, and in no acute distress. Breathing is unlabored. She is grossly oriented to person, place, and time.\par \par Left knee:\par There is tenderness to palpation medially along the MCL. There is localized swelling and ecchymosis. No valgus or valgus instability present on provocative testing. Flexion and extension 5/5.\par Range of motion: Active flexion and extension full. No crepitus.\par Tests and Signs: All tests for stability are normal. \par Strength: flexion and extension 5/5 \par \par Right Shoulder:\par Inspection/ Palpation: there is no tenderness, swelling, or deformities. \par Range of Motion: active flexion, passive flexion, abduction, external rotation is full but with pain in all planes. No crepitus.\par Strength: forward elevation, internal rotation, external rotation, adduction, and abduction are 5/5. \par Stability: no joint instability on provocative testing.  [de-identified] : AP, transcapula, and axillary views of the rightshoulder were obtained and revealed no abnormalities. No acute fracture. No dislocation. Cartilage spaces are maintained. \par \par AP, lateral, skyline, and tunnel views of the right knee were obtained and revealed mild osteoarthritis of the lateral compartment.

## 2022-05-16 NOTE — ADDENDUM
[FreeTextEntry1] : I, Skyla Buchanan wrote this note acting as a scribe for Dr. Chon Rolon on May 10, 2022.

## 2022-05-16 NOTE — END OF VISIT
[FreeTextEntry3] : All medical record entries made by the Scribe were at my,  Dr. Chon Rolon MD., direction and personally dictated by me on 05/10/2022. I have personally reviewed the chart and agree that the record accurately reflects my personal performance of the history, physical exam, assessment and plan.

## 2022-06-08 NOTE — ED PROVIDER NOTE - INTERNATIONAL TRAVEL
The patient has a diagnosis of morbid obesity.  She has not lost any weight since her last office visit in April 2022.  She was encouraged to increase her exercise, decrease her caloric intake, and reduce her weight.   No

## 2022-06-18 ENCOUNTER — EMERGENCY (EMERGENCY)
Facility: HOSPITAL | Age: 77
LOS: 1 days | Discharge: HOME | End: 2022-06-18
Attending: STUDENT IN AN ORGANIZED HEALTH CARE EDUCATION/TRAINING PROGRAM | Admitting: STUDENT IN AN ORGANIZED HEALTH CARE EDUCATION/TRAINING PROGRAM
Payer: MEDICARE

## 2022-06-18 VITALS
WEIGHT: 128.97 LBS | OXYGEN SATURATION: 96 % | TEMPERATURE: 98 F | SYSTOLIC BLOOD PRESSURE: 153 MMHG | DIASTOLIC BLOOD PRESSURE: 71 MMHG | HEIGHT: 64 IN | RESPIRATION RATE: 18 BRPM | HEART RATE: 70 BPM

## 2022-06-18 VITALS
SYSTOLIC BLOOD PRESSURE: 153 MMHG | OXYGEN SATURATION: 96 % | DIASTOLIC BLOOD PRESSURE: 68 MMHG | HEART RATE: 70 BPM | TEMPERATURE: 98 F | RESPIRATION RATE: 19 BRPM

## 2022-06-18 LAB
B PERT DNA SPEC QL NAA+PROBE: SIGNIFICANT CHANGE UP
C PNEUM DNA SPEC QL NAA+PROBE: SIGNIFICANT CHANGE UP
FLUAV H1 2009 PAND RNA SPEC QL NAA+PROBE: SIGNIFICANT CHANGE UP
FLUAV H1 RNA SPEC QL NAA+PROBE: SIGNIFICANT CHANGE UP
FLUAV H3 RNA SPEC QL NAA+PROBE: SIGNIFICANT CHANGE UP
FLUAV SUBTYP SPEC NAA+PROBE: SIGNIFICANT CHANGE UP
FLUBV RNA SPEC QL NAA+PROBE: SIGNIFICANT CHANGE UP
HADV DNA SPEC QL NAA+PROBE: SIGNIFICANT CHANGE UP
HCOV PNL SPEC NAA+PROBE: SIGNIFICANT CHANGE UP
HMPV RNA SPEC QL NAA+PROBE: SIGNIFICANT CHANGE UP
HPIV1 RNA SPEC QL NAA+PROBE: SIGNIFICANT CHANGE UP
HPIV2 RNA SPEC QL NAA+PROBE: SIGNIFICANT CHANGE UP
HPIV3 RNA SPEC QL NAA+PROBE: SIGNIFICANT CHANGE UP
HPIV4 RNA SPEC QL NAA+PROBE: SIGNIFICANT CHANGE UP
RAPID RVP RESULT: DETECTED
RV+EV RNA SPEC QL NAA+PROBE: DETECTED
SARS-COV-2 RNA SPEC QL NAA+PROBE: SIGNIFICANT CHANGE UP

## 2022-06-18 PROCEDURE — 71046 X-RAY EXAM CHEST 2 VIEWS: CPT | Mod: 26

## 2022-06-18 PROCEDURE — 99284 EMERGENCY DEPT VISIT MOD MDM: CPT

## 2022-06-18 PROCEDURE — 94640 AIRWAY INHALATION TREATMENT: CPT

## 2022-06-18 PROCEDURE — 71046 X-RAY EXAM CHEST 2 VIEWS: CPT

## 2022-06-18 PROCEDURE — 0225U NFCT DS DNA&RNA 21 SARSCOV2: CPT

## 2022-06-18 PROCEDURE — 99285 EMERGENCY DEPT VISIT HI MDM: CPT | Mod: 25

## 2022-06-18 RX ORDER — IPRATROPIUM BROMIDE 0.2 MG/ML
500 SOLUTION, NON-ORAL INHALATION
Refills: 0 | Status: COMPLETED | OUTPATIENT
Start: 2022-06-18 | End: 2022-06-18

## 2022-06-18 RX ORDER — ALBUTEROL 90 UG/1
2.5 AEROSOL, METERED ORAL
Refills: 0 | Status: COMPLETED | OUTPATIENT
Start: 2022-06-18 | End: 2022-06-18

## 2022-06-18 RX ADMIN — ALBUTEROL 2.5 MILLIGRAM(S): 90 AEROSOL, METERED ORAL at 04:29

## 2022-06-18 RX ADMIN — ALBUTEROL 2.5 MILLIGRAM(S): 90 AEROSOL, METERED ORAL at 03:55

## 2022-06-18 RX ADMIN — ALBUTEROL 2.5 MILLIGRAM(S): 90 AEROSOL, METERED ORAL at 03:21

## 2022-06-18 RX ADMIN — Medication 500 MICROGRAM(S): at 04:34

## 2022-06-18 RX ADMIN — Medication 500 MICROGRAM(S): at 03:21

## 2022-06-18 RX ADMIN — Medication 500 MICROGRAM(S): at 03:55

## 2022-06-18 RX ADMIN — Medication 50 MILLIGRAM(S): at 03:21

## 2022-06-18 NOTE — ED PROVIDER NOTE - CLINICAL SUMMARY MEDICAL DECISION MAKING FREE TEXT BOX
77F with hx asthma, lung ca s/p lobectomy 2017 presents with cough, SOB, wheeze, congestion. Cough and congestion for 1 week. Pt given augmentin by pcp. No improvement. SOB and wheezing over last few days. Feels like asthma exacerbation. Pt using albuterol nebs at home q12 hours and self administering old solumedrol dose pack. No CP, fever. VSS. Pt well appearing, breathing comfortably on RA. B/l biphasic wheeze. Given combi nebs and prednisone with improvement. Good air movement. Able to ambulate without difficulty or tachypnea. CXR with no ptx or opacity on my wet read. Viral testing rhinovirus+. Asthma exacerbation in the setting of viral URI. Stable for dc with prednisone, albuterol, pmd fu.

## 2022-06-18 NOTE — ED PROVIDER NOTE - NSFOLLOWUPINSTRUCTIONS_ED_ALL_ED_FT
Viral testing shows that you have rhinovirus. Please complete 5 day course of prednisone. Use albuterol nebulizer as needed. Follow up with primary care doctor. Return to the ED for any concerning symptoms.    Asthma Attack       Asthma attack, also called acute bronchospasm, is the sudden narrowing and tightening of the air passages, which limits the amount of oxygen that can get into the lungs. The narrowing is caused by inflammation and tightening of the muscles in the air tubes (bronchi) in the lungs.    Too much mucus is also produced, which narrows the airways more. This can cause trouble breathing, loud breathing (wheezing), and coughing. The goal of treatment is to open the airways in the lungs and reduce inflammation.      What are the causes?    Possible causes or triggers of this condition include:  •Animal dander, dust mites, or cockroaches.      •Mold, pollen from trees or grass, or cold air.      •Air pollutants such as dust, household , aerosol sprays, strong chemicals, strong odors, and smoke of any kind.      •Stress or strong emotions such as crying or laughing hard.      •Exercise or activity that requires a lot of energy.      •Substances in foods and drinks, such as dried fruits and wine, called sulfites.    •Certain medicines or medical conditions such as:  •Aspirin or beta-blockers.      •Infections or inflammatory conditions, such as a flu (influenza), a cold, pneumonia, or inflammation of the nasal membranes (rhinitis).      •Gastroesophageal reflux disease (GERD). GERD is a condition in which stomach acid backs up into your esophagus and spills into your trachea (windpipe), which can irritate your airways.          What are the signs or symptoms?    Symptoms of this condition include:  •Wheezing. This may sound like whistling while breathing. This may only happen at night.      •Excessive coughing. This may only happen at night.      •Chest tightness or pain.      •Shortness of breath.      •Feeling like you cannot get enough air no matter how hard you breathe (air hunger).        How is this diagnosed?    This condition may be diagnosed based on:  •Your medical history.      •Your symptoms.      •A physical exam.    •Tests to check for other causes of your symptoms or other conditions that may have triggered your asthma attack. These tests may include:  •A chest X-ray.      •Blood tests.      •Tests to assess lung function, such as breathing into a device that measures how much air you can inhale and exhale (spirometry).          How is this treated?    Treatment for this condition depends on the severity and cause of your asthma attack.•For mild attacks, you may receive medicines through a hand-held inhaler (metered dose inhaler, or MDI) or through a device that turns liquid medicine into a mist (nebulizer). These medicines include:  •Quick relief or rescue medicines that quickly relax the airways and lungs.      •Long-acting medicines that are used daily to prevent (control) your asthma symptoms.        •For moderate or severe attacks, you may be treated with steroid medicines by mouth or through an IV injection at the hospital.      •For severe attacks, you may need oxygen therapy or a breathing machine (ventilator).      •If your asthma attack was caused by an infection from bacteria, you will be given antibiotic medicines.        Follow these instructions at home:    Medicines   •Take over-the-counter and prescription medicines only as told by your health care provider.   •Keep your medicines up-to-date.       •Make sure you have all of your medicines available at all times.        •If you were prescribed an antibiotic medicine, take it as told by your health care provider. Do not stop taking the antibiotic even if you start to feel better.      •Tell your doctor if you may be pregnant to make sure your asthma medicine is safe to use during pregnancy.        Avoiding triggers      •Keep track of things that trigger your asthma attacks. Avoid exposure to these triggers.      • Do not use any products that contain nicotine or tobacco, such as cigarettes, e-cigarettes, and chewing tobacco. If you need help quitting, ask your health care provider.      •When there is a lot of pollen, air pollution, or humidity, keep windows closed and use an air conditioner or go to places with air conditioning.      Asthma action plan   •Work with your health care provider to make a written plan for managing and treating your asthma attacks (asthma action plan). This plan should include:  •A list of your asthma triggers and how to avoid them.      •A list of symptoms that you may have during an asthma attack.      •Information about which medicine to take, when to take the medicine and how much of the medicine to take.      •Information to help you understand your peak flow measurements.      •Daily actions that you can take to control your asthma symptoms.      •Contact information for your health care providers.        •If you have an asthma attack, act quickly. Follow the emergency steps on your written asthma action plan. This may prevent you from needing to go to the hospital.      General instructions     •Avoid excessive exercise or activity until your asthma attack goes away. Ask your health care provider what activities are safe for you and when you can return to your normal activities.      •Stay up to date on all your vaccines, such as flu and pneumonia vaccines.      •Drink enough fluid to keep your urine pale yellow. Staying hydrated helps keep mucus in your lungs thin so it can be coughed up easily.      • Do not use alcohol until you have recovered.      •Keep all follow-up visits as told by your health care provider. This is important. Asthma requires careful medical care.        Contact a health care provider if:    •You have followed your action plan for 1 hour and your peak flow reading is still at 50–79%. This is in the yellow zone, which means "caution."      •You need to use your quick reliever medicine more frequently than normal.      •Your medicines are causing side effects, such as rash, itching, swelling, or trouble breathing.      •Your symptoms do not improve after 48 hours.      •You cough up mucus that is thicker than usual.      •You have a fever.        Get help right away if:    •Your peak flow reading is less than 50% of your personal best. This is in the red zone, which means "danger."      •You have trouble breathing.      •You develop chest pain or discomfort.      •Your medicines no longer seem to be helping.      •You are coughing up bloody mucus.      •You have a fever and your symptoms suddenly get worse.      •You have trouble swallowing.      •You feel very tired, and breathing becomes tiring.      These symptoms may represent a serious problem that is an emergency. Do not wait to see if the symptoms will go away. Get medical help right away. Call your local emergency services (911 in the [place] Do not drive yourself to the hospital.       Summary    •Asthma attacks are caused by narrowing or tightness in air passages, which causes shortness of breath, coughing, and loud breathing (wheezing).      •Many things can trigger an asthma attack, such as allergens, weather changes, exercise, strong odors, and smoke of any kind.      •If you have an asthma attack, act quickly. Follow the emergency steps on your written asthma action plan.      •Get help right away if you have severe trouble breathing, chest pain, or fever, or if your home medicines are no longer helping with your symptoms.      This information is not intended to replace advice given to you by your health care provider. Make sure you discuss any questions you have with your health care provider.      Document Revised: 12/15/2020 Document Reviewed: 12/15/2020    Elsevier Patient Education © 2022 Elsevier Inc.

## 2022-06-18 NOTE — ED ADULT TRIAGE NOTE - CHIEF COMPLAINT QUOTE
c/o productive cough with associated SOB x1 week. Pt. reports being covid x2 negative. Per pt. seen by PCP and rx. steroid and antibiotic with no relief. Pt. also has neb tx. at home for hx. of asthma also of note hx. of lung CA. Per. pt. has been in remission for 3 years. Pt. also states she feels "rattling in her chest" when she lays down.

## 2022-06-18 NOTE — ED PROVIDER NOTE - PATIENT PORTAL LINK FT
You can access the FollowMyHealth Patient Portal offered by [facility] by registering at the following website: [email or web address]. By joining [employer]’s FollowMyHealth portal, you will also be able to view your health information using other applications (apps) compatible with our system.

## 2022-06-23 NOTE — CHART NOTE - NSCHARTNOTEFT_GEN_A_CORE
SW placed call to patient to discuss and assist with follow up care.  Patient presented to ED on 6/18/22 for SOB.  Patient did not answer follow up call, VM left.

## 2022-09-01 ENCOUNTER — NON-APPOINTMENT (OUTPATIENT)
Age: 77
End: 2022-09-01

## 2022-09-06 ENCOUNTER — APPOINTMENT (OUTPATIENT)
Dept: ORTHOPEDIC SURGERY | Facility: CLINIC | Age: 77
End: 2022-09-06

## 2022-09-06 DIAGNOSIS — S60.221A CONTUSION OF RIGHT HAND, INITIAL ENCOUNTER: ICD-10-CM

## 2022-09-06 PROCEDURE — 73130 X-RAY EXAM OF HAND: CPT | Mod: RT

## 2022-09-06 PROCEDURE — 99213 OFFICE O/P EST LOW 20 MIN: CPT

## 2022-09-06 NOTE — ADDENDUM
[FreeTextEntry1] : I, Burton Abdi, wrote this note acting as a scribe for Dr. Chon Rolon on Sep 06, 2022.

## 2022-09-06 NOTE — END OF VISIT
[FreeTextEntry3] : All medical record entries made by the Scribe were at my, Dr. Chon Rolon MD., direction and personally dictated by me on 09/06/2022. I have personally reviewed the chart and agree that the record accurately reflects my personal performance of the history, physical exam, assessment and plan.

## 2022-09-06 NOTE — PHYSICAL EXAM
[de-identified] : Patient is WDWN, alert, and in no acute distress. Breathing is unlabored. She is grossly oriented to person, place, and time.\par \par Right Hand (Index Finger):\par Tenderness and swelling about the second MCP joint.\par Full ROM. Pain with extension of the digit. \par Strength is limited by pain. [de-identified] : AP, lateral and oblique views of the right HAND were obtained today and revealed no abnormalities. No acute fracture. No dislocation. Cartilage spaces are maintained.

## 2022-09-06 NOTE — DISCUSSION/SUMMARY
[FreeTextEntry1] : The underlying pathophysiology was reviewed with the patient. XR films were reviewed with the patient. Discussed at length the nature of the patient’s condition. The right hand symptoms appear secondary to contusion versus inflammation. \par \par Treatment options were discussed including; observation, NSAIDs, Tylenol, topical analgesics, injection(s), buddy taping, and surgical intervention. \par \par I reassured her there are no fractures.\par NSAIDs as tolerated and OTC Voltaren gel.\par We discussed a possible cortisone injection. Patient would like to hold off. \par \par All questions answered, understanding verbalized. Patient in agreement with plan of care. Follow up as needed.

## 2022-09-06 NOTE — HISTORY OF PRESENT ILLNESS
[Right] : right hand dominant [FreeTextEntry1] : Patient is a 77 year old RHD female who presents with complaints of pain and swelling about the MCP joint of the 2nd digit/index finger. She recalls hitting the area at some point but the pain was present before this injury. She notes that her right hand has been progressively getting weaker for quite some time. However, the pain and swelling about the index finger started more recently and has worsened. She notes significant sensitivity to light touch and pain worst with flexion of the finger. She also complains of mild discomfort that radiates towards her wrist. She denies numbness and tingling. Denies locking or triggering about the finger. This pain is interfering with her daily activities. She has been taking Tylenol and Advil. \par She was last seen for her right shoulder. She states the shoulder still bothers her but not enough for another injection or formal PT.

## 2022-09-13 ENCOUNTER — APPOINTMENT (OUTPATIENT)
Dept: OBGYN | Facility: CLINIC | Age: 77
End: 2022-09-13

## 2022-09-13 VITALS
OXYGEN SATURATION: 98 % | HEIGHT: 64.5 IN | DIASTOLIC BLOOD PRESSURE: 80 MMHG | BODY MASS INDEX: 22.26 KG/M2 | TEMPERATURE: 97.5 F | HEART RATE: 74 BPM | SYSTOLIC BLOOD PRESSURE: 122 MMHG | WEIGHT: 132 LBS

## 2022-09-13 DIAGNOSIS — R10.2 PELVIC AND PERINEAL PAIN: ICD-10-CM

## 2022-09-13 DIAGNOSIS — Z01.419 ENCOUNTER FOR GYNECOLOGICAL EXAMINATION (GENERAL) (ROUTINE) W/OUT ABNORMAL FINDINGS: ICD-10-CM

## 2022-09-13 PROCEDURE — G0101: CPT

## 2022-09-13 RX ORDER — OXYCODONE HYDROCHLORIDE AND ACETAMINOPHEN 5; 325 MG/1; MG/1
5-325 TABLET ORAL
Refills: 0 | Status: DISCONTINUED | COMMUNITY
End: 2022-09-13

## 2022-09-13 RX ORDER — AZITHROMYCIN 250 MG/1
250 TABLET, FILM COATED ORAL
Qty: 6 | Refills: 0 | Status: DISCONTINUED | COMMUNITY
Start: 2018-11-06 | End: 2022-09-13

## 2022-09-13 RX ORDER — CEFADROXIL 500 MG/1
500 CAPSULE ORAL
Refills: 0 | Status: DISCONTINUED | COMMUNITY
End: 2022-09-13

## 2022-09-13 RX ORDER — HALOBETASOL PROPIONATE 0.5 MG/G
0.05 CREAM TOPICAL
Qty: 50 | Refills: 0 | Status: DISCONTINUED | COMMUNITY
Start: 2019-03-21 | End: 2022-09-13

## 2022-09-13 RX ORDER — VALACYCLOVIR 1 G/1
1 TABLET, FILM COATED ORAL
Qty: 21 | Refills: 0 | Status: DISCONTINUED | COMMUNITY
Start: 2020-06-24 | End: 2022-09-13

## 2022-09-13 RX ORDER — NEOMYCIN AND POLYMYXIN B SULFATES AND DEXAMETHASONE 3.5; 10000; 1 MG/G; [IU]/G; MG/G
3.5-10000-0.1 OINTMENT OPHTHALMIC
Qty: 3 | Refills: 0 | Status: DISCONTINUED | COMMUNITY
Start: 2019-03-06 | End: 2022-09-13

## 2022-09-13 RX ORDER — METHYLPREDNISOLONE 4 MG/1
4 TABLET ORAL
Qty: 21 | Refills: 0 | Status: DISCONTINUED | COMMUNITY
Start: 2019-03-21 | End: 2022-09-13

## 2022-09-13 RX ORDER — PREDNISONE 10 MG/1
10 TABLET ORAL
Qty: 20 | Refills: 0 | Status: DISCONTINUED | COMMUNITY
Start: 2019-02-04 | End: 2022-09-13

## 2022-09-13 RX ORDER — ETODOLAC 500 MG/1
500 TABLET, FILM COATED ORAL TWICE DAILY
Refills: 0 | Status: DISCONTINUED | COMMUNITY
End: 2022-09-13

## 2022-09-13 RX ORDER — PREDNISONE 50 MG/1
50 TABLET ORAL
Qty: 3 | Refills: 0 | Status: DISCONTINUED | COMMUNITY
Start: 2019-10-29 | End: 2022-09-13

## 2022-09-13 RX ORDER — TOBRAMYCIN AND DEXAMETHASONE 3; 1 MG/ML; MG/ML
0.3-0.1 SUSPENSION/ DROPS OPHTHALMIC
Qty: 2 | Refills: 0 | Status: DISCONTINUED | COMMUNITY
Start: 2019-03-06 | End: 2022-09-13

## 2022-09-13 RX ORDER — ESCITALOPRAM OXALATE 5 MG/1
TABLET, FILM COATED ORAL
Refills: 0 | Status: DISCONTINUED | COMMUNITY
End: 2022-09-13

## 2022-09-13 NOTE — HISTORY OF PRESENT ILLNESS
[FreeTextEntry1] : 77  presents today for well woman exam.\par \par LMP: 50\par \par POB: denies\par PGYN:  , nl pap\par PMH: asthma, hx of R lung ca\par PSH: right lung lobectomy, bartholins\par Med: per MAR\par All: contrast, doxy, macrobid\par SH: denies\par FH: pat aunt breast CA\par \par Mammo:\par Colonoscopy:\par Dexa\par

## 2022-09-21 ENCOUNTER — TRANSCRIPTION ENCOUNTER (OUTPATIENT)
Age: 77
End: 2022-09-21

## 2022-09-21 LAB
APPEARANCE: CLEAR
BACTERIA UR CULT: NORMAL
BACTERIA: NEGATIVE
BILIRUBIN URINE: NEGATIVE
BLOOD URINE: NEGATIVE
C TRACH RRNA SPEC QL NAA+PROBE: NOT DETECTED
CANDIDA VAG CYTO: NOT DETECTED
COLOR: NORMAL
CYTOLOGY CVX/VAG DOC THIN PREP: ABNORMAL
G VAGINALIS+PREV SP MTYP VAG QL MICRO: NOT DETECTED
GLUCOSE QUALITATIVE U: NEGATIVE
HPV HIGH+LOW RISK DNA PNL CVX: NOT DETECTED
HYALINE CASTS: 0 /LPF
KETONES URINE: NEGATIVE
LEUKOCYTE ESTERASE URINE: NEGATIVE
MICROSCOPIC-UA: NORMAL
N GONORRHOEA RRNA SPEC QL NAA+PROBE: NOT DETECTED
NITRITE URINE: NEGATIVE
PH URINE: 6.5
PROTEIN URINE: NEGATIVE
RED BLOOD CELLS URINE: 1 /HPF
SOURCE AMPLIFICATION: NORMAL
SPECIFIC GRAVITY URINE: 1.01
SQUAMOUS EPITHELIAL CELLS: 0 /HPF
T VAGINALIS VAG QL WET PREP: NOT DETECTED
UROBILINOGEN URINE: NORMAL
WHITE BLOOD CELLS URINE: 0 /HPF

## 2022-10-06 ENCOUNTER — APPOINTMENT (OUTPATIENT)
Dept: ULTRASOUND IMAGING | Facility: CLINIC | Age: 77
End: 2022-10-06

## 2022-10-06 ENCOUNTER — APPOINTMENT (OUTPATIENT)
Dept: MAMMOGRAPHY | Facility: CLINIC | Age: 77
End: 2022-10-06

## 2022-10-26 ENCOUNTER — EMERGENCY (EMERGENCY)
Facility: HOSPITAL | Age: 77
LOS: 1 days | Discharge: HOME | End: 2022-10-26
Attending: INTERNAL MEDICINE | Admitting: INTERNAL MEDICINE
Payer: MEDICARE

## 2022-10-26 VITALS
RESPIRATION RATE: 19 BRPM | WEIGHT: 132.94 LBS | HEIGHT: 64 IN | OXYGEN SATURATION: 97 % | HEART RATE: 88 BPM | SYSTOLIC BLOOD PRESSURE: 144 MMHG | TEMPERATURE: 98 F | DIASTOLIC BLOOD PRESSURE: 73 MMHG

## 2022-10-26 LAB
ALBUMIN SERPL ELPH-MCNC: 3.4 G/DL — SIGNIFICANT CHANGE UP (ref 3.3–5)
ALP SERPL-CCNC: 74 U/L — SIGNIFICANT CHANGE UP (ref 40–120)
ALT FLD-CCNC: 54 U/L — HIGH (ref 10–45)
ANION GAP SERPL CALC-SCNC: 6 MMOL/L — SIGNIFICANT CHANGE UP (ref 5–17)
AST SERPL-CCNC: 42 U/L — HIGH (ref 10–40)
BASOPHILS # BLD AUTO: 0.03 K/UL — SIGNIFICANT CHANGE UP (ref 0–0.2)
BASOPHILS NFR BLD AUTO: 0.6 % — SIGNIFICANT CHANGE UP (ref 0–2)
BILIRUB SERPL-MCNC: 0.3 MG/DL — SIGNIFICANT CHANGE UP (ref 0.2–1.2)
BUN SERPL-MCNC: 12 MG/DL — SIGNIFICANT CHANGE UP (ref 7–23)
CALCIUM SERPL-MCNC: 8.7 MG/DL — SIGNIFICANT CHANGE UP (ref 8.4–10.5)
CHLORIDE SERPL-SCNC: 103 MMOL/L — SIGNIFICANT CHANGE UP (ref 96–108)
CO2 SERPL-SCNC: 30 MMOL/L — SIGNIFICANT CHANGE UP (ref 22–31)
CREAT SERPL-MCNC: 0.76 MG/DL — SIGNIFICANT CHANGE UP (ref 0.5–1.3)
EGFR: 81 ML/MIN/1.73M2 — SIGNIFICANT CHANGE UP
EOSINOPHIL # BLD AUTO: 0.12 K/UL — SIGNIFICANT CHANGE UP (ref 0–0.5)
EOSINOPHIL NFR BLD AUTO: 2.2 % — SIGNIFICANT CHANGE UP (ref 0–6)
GLUCOSE SERPL-MCNC: 102 MG/DL — HIGH (ref 70–99)
HCT VFR BLD CALC: 37.5 % — SIGNIFICANT CHANGE UP (ref 34.5–45)
HGB BLD-MCNC: 12.2 G/DL — SIGNIFICANT CHANGE UP (ref 11.5–15.5)
IMM GRANULOCYTES NFR BLD AUTO: 0.4 % — SIGNIFICANT CHANGE UP (ref 0–0.9)
LYMPHOCYTES # BLD AUTO: 1.61 K/UL — SIGNIFICANT CHANGE UP (ref 1–3.3)
LYMPHOCYTES # BLD AUTO: 29.5 % — SIGNIFICANT CHANGE UP (ref 13–44)
MCHC RBC-ENTMCNC: 30.3 PG — SIGNIFICANT CHANGE UP (ref 27–34)
MCHC RBC-ENTMCNC: 32.5 GM/DL — SIGNIFICANT CHANGE UP (ref 32–36)
MCV RBC AUTO: 93.3 FL — SIGNIFICANT CHANGE UP (ref 80–100)
MONOCYTES # BLD AUTO: 0.67 K/UL — SIGNIFICANT CHANGE UP (ref 0–0.9)
MONOCYTES NFR BLD AUTO: 12.3 % — SIGNIFICANT CHANGE UP (ref 2–14)
NEUTROPHILS # BLD AUTO: 3 K/UL — SIGNIFICANT CHANGE UP (ref 1.8–7.4)
NEUTROPHILS NFR BLD AUTO: 55 % — SIGNIFICANT CHANGE UP (ref 43–77)
NRBC # BLD: 0 /100 WBCS — SIGNIFICANT CHANGE UP (ref 0–0)
NT-PROBNP SERPL-SCNC: 113 PG/ML — SIGNIFICANT CHANGE UP (ref 0–300)
PLATELET # BLD AUTO: 186 K/UL — SIGNIFICANT CHANGE UP (ref 150–400)
POTASSIUM SERPL-MCNC: 4 MMOL/L — SIGNIFICANT CHANGE UP (ref 3.5–5.3)
POTASSIUM SERPL-SCNC: 4 MMOL/L — SIGNIFICANT CHANGE UP (ref 3.5–5.3)
PROT SERPL-MCNC: 6.9 G/DL — SIGNIFICANT CHANGE UP (ref 6–8.3)
RBC # BLD: 4.02 M/UL — SIGNIFICANT CHANGE UP (ref 3.8–5.2)
RBC # FLD: 12.9 % — SIGNIFICANT CHANGE UP (ref 10.3–14.5)
SARS-COV-2 RNA SPEC QL NAA+PROBE: DETECTED
SODIUM SERPL-SCNC: 139 MMOL/L — SIGNIFICANT CHANGE UP (ref 135–145)
TROPONIN I, HIGH SENSITIVITY RESULT: 6.6 NG/L — SIGNIFICANT CHANGE UP
WBC # BLD: 5.45 K/UL — SIGNIFICANT CHANGE UP (ref 3.8–10.5)
WBC # FLD AUTO: 5.45 K/UL — SIGNIFICANT CHANGE UP (ref 3.8–10.5)

## 2022-10-26 PROCEDURE — 99285 EMERGENCY DEPT VISIT HI MDM: CPT | Mod: 25

## 2022-10-26 PROCEDURE — 87635 SARS-COV-2 COVID-19 AMP PRB: CPT

## 2022-10-26 PROCEDURE — 94640 AIRWAY INHALATION TREATMENT: CPT

## 2022-10-26 PROCEDURE — 96374 THER/PROPH/DIAG INJ IV PUSH: CPT

## 2022-10-26 PROCEDURE — 99285 EMERGENCY DEPT VISIT HI MDM: CPT | Mod: CS

## 2022-10-26 PROCEDURE — 71045 X-RAY EXAM CHEST 1 VIEW: CPT | Mod: 26

## 2022-10-26 PROCEDURE — 36415 COLL VENOUS BLD VENIPUNCTURE: CPT

## 2022-10-26 PROCEDURE — 84484 ASSAY OF TROPONIN QUANT: CPT

## 2022-10-26 PROCEDURE — 80053 COMPREHEN METABOLIC PANEL: CPT

## 2022-10-26 PROCEDURE — 71045 X-RAY EXAM CHEST 1 VIEW: CPT

## 2022-10-26 PROCEDURE — 85025 COMPLETE CBC W/AUTO DIFF WBC: CPT

## 2022-10-26 PROCEDURE — 93005 ELECTROCARDIOGRAM TRACING: CPT

## 2022-10-26 PROCEDURE — 83880 ASSAY OF NATRIURETIC PEPTIDE: CPT

## 2022-10-26 PROCEDURE — 93010 ELECTROCARDIOGRAM REPORT: CPT

## 2022-10-26 RX ORDER — IPRATROPIUM BROMIDE 0.2 MG/ML
1 SOLUTION, NON-ORAL INHALATION ONCE
Refills: 0 | Status: COMPLETED | OUTPATIENT
Start: 2022-10-26 | End: 2022-10-26

## 2022-10-26 RX ORDER — ALBUTEROL 90 UG/1
2 AEROSOL, METERED ORAL ONCE
Refills: 0 | Status: COMPLETED | OUTPATIENT
Start: 2022-10-26 | End: 2022-10-26

## 2022-10-26 RX ORDER — GUAIFENESIN/DEXTROMETHORPHAN 600MG-30MG
10 TABLET, EXTENDED RELEASE 12 HR ORAL ONCE
Refills: 0 | Status: COMPLETED | OUTPATIENT
Start: 2022-10-26 | End: 2022-10-26

## 2022-10-26 RX ORDER — ALBUTEROL 90 UG/1
2 AEROSOL, METERED ORAL
Refills: 0 | Status: DISCONTINUED | OUTPATIENT
Start: 2022-10-26 | End: 2022-10-30

## 2022-10-26 RX ORDER — DEXAMETHASONE 0.5 MG/5ML
10 ELIXIR ORAL ONCE
Refills: 0 | Status: COMPLETED | OUTPATIENT
Start: 2022-10-26 | End: 2022-10-26

## 2022-10-26 RX ORDER — NIRMATRELVIR AND RITONAVIR 150-100 MG
3 KIT ORAL
Qty: 30 | Refills: 0
Start: 2022-10-26 | End: 2022-10-30

## 2022-10-26 RX ORDER — ONDANSETRON 8 MG/1
1 TABLET, FILM COATED ORAL
Qty: 30 | Refills: 0
Start: 2022-10-26 | End: 2022-11-04

## 2022-10-26 RX ORDER — DEXAMETHASONE 0.5 MG/5ML
10 ELIXIR ORAL
Qty: 20 | Refills: 0
Start: 2022-10-26 | End: 2022-10-29

## 2022-10-26 RX ADMIN — ALBUTEROL 2 PUFF(S): 90 AEROSOL, METERED ORAL at 15:43

## 2022-10-26 RX ADMIN — Medication 102 MILLIGRAM(S): at 15:43

## 2022-10-26 RX ADMIN — Medication 1 PUFF(S): at 15:44

## 2022-10-26 RX ADMIN — Medication 10 MILLILITER(S): at 15:44

## 2022-10-26 NOTE — ED ADULT TRIAGE NOTE - CHIEF COMPLAINT QUOTE
Pt stated sent by Dr. Looney, c/o trouble breathing, Covid + Sunday,h/o asthma, did neb treatment prior to coming in

## 2022-10-26 NOTE — ED ADULT NURSE NOTE - OBJECTIVE STATEMENT
PT presents to the ED with c/o having covid since Sunday and having SOB. Her Doctor sent her in for eval.

## 2022-10-26 NOTE — ED PROVIDER NOTE - CARE PROVIDER_API CALL
ADELA VERONICA  [address]  [place] [address]  Phone: [phone]  Fax: [phone]  Follow Up Time:

## 2022-10-26 NOTE — ED ADULT NURSE NOTE - NSIMPLEMENTINTERV_GEN_ALL_ED
I've discussed the patient in detail with the resident. I agree with all of the components of his note.     Implemented All Universal Safety Interventions:  [place] to call system. Call bell, personal items and telephone within reach. Instruct patient to call for assistance. Room bathroom lighting operational. Non-slip footwear when patient is off stretcher. Physically safe environment: no spills, clutter or unnecessary equipment. Stretcher in lowest position, wheels locked, appropriate side rails in place.

## 2022-10-26 NOTE — ED PROVIDER NOTE - CLINICAL SUMMARY MEDICAL DECISION MAKING FREE TEXT BOX
77  y f vv sob 2 days + for covid 5 days ago at home no fever no body aches hx of asthma and lung cancer  cxr doug labs nl rx with albuterol , atrovent , decadron  improved case d/w pmd plan paxlovid , steroids continue nebs at home

## 2022-10-26 NOTE — ED PROVIDER NOTE - PATIENT PORTAL LINK FT
You can access the FollowMyHealth Patient Portal offered by [facility] by registering at the following website: [email or web address]. By joining [employer]’s FollowMyHealth portal, you will also be able to view your health information using other applications (apps) compatible with our system.

## 2022-10-26 NOTE — ED PROVIDER NOTE - OBJECTIVE STATEMENT
77  y f vv sob 2 days + for covid 5 days ago at home no fever no body aches hx of asthma and lung cancer

## 2022-10-26 NOTE — ED PROVIDER NOTE - PHYSICAL EXAMINATION
General:     NAD, well-nourished, well-appearing  Head:     NC/AT, EOMI, oral mucosa moist  Neck:     trachea midline  Lungs:     bilateral wheezing peak [address]  CVS:     S1S2, RRR, no m/g/r  Abd:     +BS, s/nt/nd, no organomegaly  Ext:    2+ radial and pedal pulses, no c/c/e  Neuro: AAOx3, no sensory/motor deficits

## 2022-10-30 NOTE — CHART NOTE - NSCHARTNOTEFT_GEN_A_CORE
SW called pt to discuss and assist with follow up care.  Pt is  a 78 y/o female presenting ED for trouble breathing.  Pt reports that she is still experiencing some discomfort due to Covid positive, taking and medication and has not scheduled an appt with primary due to Covid.  Pt declined SW assistance, encouraged to call SW if further assistance is needed.

## 2022-11-08 ENCOUNTER — EMERGENCY (EMERGENCY)
Facility: HOSPITAL | Age: 77
LOS: 1 days | Discharge: HOME | End: 2022-11-08
Attending: EMERGENCY MEDICINE | Admitting: EMERGENCY MEDICINE
Payer: MEDICARE

## 2022-11-08 VITALS
DIASTOLIC BLOOD PRESSURE: 84 MMHG | RESPIRATION RATE: 20 BRPM | HEIGHT: 64 IN | HEART RATE: 100 BPM | WEIGHT: 132.06 LBS | OXYGEN SATURATION: 96 % | TEMPERATURE: 98 F | SYSTOLIC BLOOD PRESSURE: 135 MMHG

## 2022-11-08 VITALS — RESPIRATION RATE: 20 BRPM | OXYGEN SATURATION: 98 %

## 2022-11-08 LAB
ALBUMIN SERPL ELPH-MCNC: 3.4 G/DL — SIGNIFICANT CHANGE UP (ref 3.3–5)
ALP SERPL-CCNC: 74 U/L — SIGNIFICANT CHANGE UP (ref 40–120)
ALT FLD-CCNC: 47 U/L — HIGH (ref 10–45)
ANION GAP SERPL CALC-SCNC: 2 MMOL/L — LOW (ref 5–17)
AST SERPL-CCNC: 18 U/L — SIGNIFICANT CHANGE UP (ref 10–40)
B PERT DNA SPEC QL NAA+PROBE: SIGNIFICANT CHANGE UP
BASOPHILS # BLD AUTO: 0.02 K/UL — SIGNIFICANT CHANGE UP (ref 0–0.2)
BASOPHILS NFR BLD AUTO: 0.1 % — SIGNIFICANT CHANGE UP (ref 0–2)
BILIRUB SERPL-MCNC: 0.5 MG/DL — SIGNIFICANT CHANGE UP (ref 0.2–1.2)
BUN SERPL-MCNC: 19 MG/DL — SIGNIFICANT CHANGE UP (ref 7–23)
C PNEUM DNA SPEC QL NAA+PROBE: SIGNIFICANT CHANGE UP
CALCIUM SERPL-MCNC: 8.3 MG/DL — LOW (ref 8.4–10.5)
CHLORIDE SERPL-SCNC: 102 MMOL/L — SIGNIFICANT CHANGE UP (ref 96–108)
CO2 SERPL-SCNC: 31 MMOL/L — SIGNIFICANT CHANGE UP (ref 22–31)
CREAT SERPL-MCNC: 0.88 MG/DL — SIGNIFICANT CHANGE UP (ref 0.5–1.3)
EGFR: 68 ML/MIN/1.73M2 — SIGNIFICANT CHANGE UP
EOSINOPHIL # BLD AUTO: 0.31 K/UL — SIGNIFICANT CHANGE UP (ref 0–0.5)
EOSINOPHIL NFR BLD AUTO: 2.2 % — SIGNIFICANT CHANGE UP (ref 0–6)
FLUAV H1 2009 PAND RNA SPEC QL NAA+PROBE: SIGNIFICANT CHANGE UP
FLUAV H1 RNA SPEC QL NAA+PROBE: SIGNIFICANT CHANGE UP
FLUAV H3 RNA SPEC QL NAA+PROBE: SIGNIFICANT CHANGE UP
FLUAV SUBTYP SPEC NAA+PROBE: SIGNIFICANT CHANGE UP
FLUBV RNA SPEC QL NAA+PROBE: SIGNIFICANT CHANGE UP
GLUCOSE SERPL-MCNC: 114 MG/DL — HIGH (ref 70–99)
HADV DNA SPEC QL NAA+PROBE: SIGNIFICANT CHANGE UP
HCOV 229E RNA SPEC QL NAA+PROBE: SIGNIFICANT CHANGE UP
HCOV HKU1 RNA SPEC QL NAA+PROBE: SIGNIFICANT CHANGE UP
HCOV NL63 RNA SPEC QL NAA+PROBE: SIGNIFICANT CHANGE UP
HCOV OC43 RNA SPEC QL NAA+PROBE: SIGNIFICANT CHANGE UP
HCOV PNL SPEC NAA+PROBE: SIGNIFICANT CHANGE UP
HCT VFR BLD CALC: 42.1 % — SIGNIFICANT CHANGE UP (ref 34.5–45)
HGB BLD-MCNC: 13.5 G/DL — SIGNIFICANT CHANGE UP (ref 11.5–15.5)
HMPV RNA SPEC QL NAA+PROBE: SIGNIFICANT CHANGE UP
HPIV1 RNA SPEC QL NAA+PROBE: SIGNIFICANT CHANGE UP
HPIV2 RNA SPEC QL NAA+PROBE: SIGNIFICANT CHANGE UP
HPIV3 RNA SPEC QL NAA+PROBE: SIGNIFICANT CHANGE UP
HPIV4 RNA SPEC QL NAA+PROBE: SIGNIFICANT CHANGE UP
IMM GRANULOCYTES NFR BLD AUTO: 2 % — HIGH (ref 0–0.9)
LYMPHOCYTES # BLD AUTO: 27.2 % — SIGNIFICANT CHANGE UP (ref 13–44)
LYMPHOCYTES # BLD AUTO: 3.75 K/UL — HIGH (ref 1–3.3)
MCHC RBC-ENTMCNC: 30.1 PG — SIGNIFICANT CHANGE UP (ref 27–34)
MCHC RBC-ENTMCNC: 32.1 GM/DL — SIGNIFICANT CHANGE UP (ref 32–36)
MCV RBC AUTO: 93.8 FL — SIGNIFICANT CHANGE UP (ref 80–100)
MONOCYTES # BLD AUTO: 0.96 K/UL — HIGH (ref 0–0.9)
MONOCYTES NFR BLD AUTO: 7 % — SIGNIFICANT CHANGE UP (ref 2–14)
NEUTROPHILS # BLD AUTO: 8.49 K/UL — HIGH (ref 1.8–7.4)
NEUTROPHILS NFR BLD AUTO: 61.5 % — SIGNIFICANT CHANGE UP (ref 43–77)
NRBC # BLD: 0 /100 WBCS — SIGNIFICANT CHANGE UP (ref 0–0)
PLATELET # BLD AUTO: 342 K/UL — SIGNIFICANT CHANGE UP (ref 150–400)
POTASSIUM SERPL-MCNC: 3.9 MMOL/L — SIGNIFICANT CHANGE UP (ref 3.5–5.3)
POTASSIUM SERPL-SCNC: 3.9 MMOL/L — SIGNIFICANT CHANGE UP (ref 3.5–5.3)
PROT SERPL-MCNC: 6.3 G/DL — SIGNIFICANT CHANGE UP (ref 6–8.3)
RAPID RVP RESULT: DETECTED
RBC # BLD: 4.49 M/UL — SIGNIFICANT CHANGE UP (ref 3.8–5.2)
RBC # FLD: 12.7 % — SIGNIFICANT CHANGE UP (ref 10.3–14.5)
RSV RNA SPEC QL NAA+PROBE: SIGNIFICANT CHANGE UP
RV+EV RNA SPEC QL NAA+PROBE: SIGNIFICANT CHANGE UP
SARS-COV-2 RNA SPEC QL NAA+PROBE: DETECTED
SODIUM SERPL-SCNC: 135 MMOL/L — SIGNIFICANT CHANGE UP (ref 135–145)
TROPONIN I, HIGH SENSITIVITY RESULT: 11 NG/L — SIGNIFICANT CHANGE UP
WBC # BLD: 13.81 K/UL — HIGH (ref 3.8–10.5)
WBC # FLD AUTO: 13.81 K/UL — HIGH (ref 3.8–10.5)

## 2022-11-08 PROCEDURE — 71045 X-RAY EXAM CHEST 1 VIEW: CPT

## 2022-11-08 PROCEDURE — 36415 COLL VENOUS BLD VENIPUNCTURE: CPT

## 2022-11-08 PROCEDURE — 93005 ELECTROCARDIOGRAM TRACING: CPT

## 2022-11-08 PROCEDURE — 0225U NFCT DS DNA&RNA 21 SARSCOV2: CPT

## 2022-11-08 PROCEDURE — 99285 EMERGENCY DEPT VISIT HI MDM: CPT | Mod: FS,CS

## 2022-11-08 PROCEDURE — 71045 X-RAY EXAM CHEST 1 VIEW: CPT | Mod: 26

## 2022-11-08 PROCEDURE — 80053 COMPREHEN METABOLIC PANEL: CPT

## 2022-11-08 PROCEDURE — 99285 EMERGENCY DEPT VISIT HI MDM: CPT | Mod: 25

## 2022-11-08 PROCEDURE — 93010 ELECTROCARDIOGRAM REPORT: CPT

## 2022-11-08 PROCEDURE — 85025 COMPLETE CBC W/AUTO DIFF WBC: CPT

## 2022-11-08 PROCEDURE — 84484 ASSAY OF TROPONIN QUANT: CPT

## 2022-11-08 NOTE — ED PROVIDER NOTE - NSFOLLOWUPINSTRUCTIONS_ED_ALL_ED_FT
Follow up with your pulmonologist as scheduled. Take the copy of your test results you were given in the emergency room for your doctor to review.     Stay hydrated    Return to the ER if your symptoms worsen or for any other medical emergencies  ***************    Shortness of breath    Shortness of breath (dyspnea) means you have trouble breathing and could indicate a medical problem. Causes include lung disease, heart disease, low amount of red blood cells (anemia), poor physical fitness, being overweight, smoking, etc. Your health care provider today may not be able to find a cause for your shortness of breath after your exam. In this case, it is important to have a follow-up exam with your primary care physician as instructed. If medicines were prescribed, take them as directed for the full length of time directed. Refrain from tobacco products.    SEEK IMMEDIATE MEDICAL CARE IF YOU HAVE ANY OF THE FOLLOWING SYMPTOMS: worsening shortness of breath, chest pain, back pain, abdominal pain, fever, coughing up blood, lightheadedness/dizziness.

## 2022-11-08 NOTE — ED PROVIDER NOTE - ATTENDING APP SHARED VISIT CONTRIBUTION OF CARE
Rosanna with [employer]. 76 y/o F with PMH of asthma, lung cancer (not currently on active treatment) presents to the ED with c/o SOB x today. Pt reports she had COVID 18 days ago, she was given paxlovid and dexamethasone, reports she had some improvement in sympts. She took her albuterol pump and advair today with some improvement. She has an appt with her pulmonologist scheduled in 1 week for CT chest. No f/c, n/v/d, abd pain, calf pain or swelling.  PE as noted above, labs/imaging pending, reassess    409pm: labs reviewed. CXR neg. Pt reports she feels better while in the ED. Pt not hypoxic in the ED. will dc with instructions to continue her nebs at home.    I performed a face to face bedside interview with patient regarding history of present illness, review of symptoms and past medical history. I completed an independent physical exam.  I have discussed the patient's plan of care with Physician Assistant (PA). I agree with note as stated above, having amended the EMR as needed to reflect my findings.   This includes History of Present Illness, HIV, Past Medical/Surgical/Family/Social History, Allergies and Home Medications, Review of Systems, Physical Exam, and any Progress Notes during the time I functioned as the attending physician for this patient.

## 2022-11-08 NOTE — ED PROVIDER NOTE - RESPIRATORY, MLM
Breath sounds clear and equal bilaterally.  no tachypnea, tripoding, hypoxia or signs of resp distress

## 2022-11-08 NOTE — ED PROVIDER NOTE - PATIENT PORTAL LINK FT
You can access the FollowMyHealth Patient Portal offered by [facility] by registering at the following website: [email or web address]. By joining [employer]’s FollowMyHealth portal, you will also be able to view your health information using other applications (apps) compatible with our system.

## 2022-11-08 NOTE — ED ADULT TRIAGE NOTE - CHIEF COMPLAINT QUOTE
Pt has covid for 18 dyas, took course of paxlovid.  Pt on nebulizers albuterol at home as needed, but reports still feeling short of breath.  Room air O2 sat 96%.  Pt is asthmatic.

## 2022-11-08 NOTE — ED PROVIDER NOTE - CARE PLAN
Principal Discharge DX:	SOB (shortness of breath)  Secondary Diagnosis:	2019 novel coronavirus disease (COVID-19)   1

## 2022-11-08 NOTE — ED PROVIDER NOTE - OBJECTIVE STATEMENT
76 y/o F with PMH of asthma, lung cancer (not currently on active treatment) presents to the ED with c/o SOB x today. Pt reports she had COVID 18 days ago, she was given paxlovid and dexamethasone, reports she had some improvement in sympts. She took her albuterol pump and advair today with some improvement. She has an appt with her pulmonologist scheduled in 1 week for CT chest. No f/c, n/v/d, abd pain, calf pain or swelling.

## 2022-11-08 NOTE — ED PROVIDER NOTE - CLINICAL SUMMARY MEDICAL DECISION MAKING FREE TEXT BOX
76 y/o F with PMH of asthma, lung cancer (not currently on active treatment) presents to the ED with c/o SOB x today. Pt reports she had COVID 18 days ago, she was given paxlovid and dexamethasone, reports she had some improvement in sympts. She took her albuterol pump and advair today with some improvement. She has an appt with her pulmonologist scheduled in 1 week for CT chest. No f/c, n/v/d, abd pain, calf pain or swelling.  PE as noted above, labs/imaging pending, reassess 76 y/o F with PMH of asthma, lung cancer (not currently on active treatment) presents to the ED with c/o SOB x today. Pt reports she had COVID 18 days ago, she was given paxlovid and dexamethasone, reports she had some improvement in sympts. She took her albuterol pump and advair today with some improvement. She has an appt with her pulmonologist scheduled in 1 week for CT chest. No f/c, n/v/d, abd pain, calf pain or swelling.  PE as noted above, labs/imaging pending, reassess    409pm: labs reviewed. CXR neg. Pt reports she feels better while in the ED. Pt not hypoxic in the ED. will dc with instructions to continue her nebs at home.

## 2022-11-08 NOTE — ED ADULT TRIAGE NOTE - NS ED TRIAGE AVPU SCALE
(4 ounces) of orange juice each contain about 15 grams of carbohydrate. · Check your blood sugar at least every 3 to 4 hours. If it goes up fast, check it more often. And check it even through the night. Take insulin if your doctor told you to do so. If you and your doctor did not have a sick-day plan for taking extra insulin, call him or her for advice. · If you take insulin, check your urine or blood for ketones. This is even more important if your blood sugar is high. · Do not take any over-the-counter medicines, such as pain relievers, decongestants, or herbal products or other natural medicines, without talking with your doctor first.  · Do not drive. If you need to see your doctor or go anywhere else, ask a family member or friend to drive you. When should you call for help? Call 911 anytime you think you may need emergency care. For example, call if:  ? · You passed out (lost consciousness). ? · You are confused or cannot think clearly. ? · Your blood sugar is very high or very low. ? Watch closely for changes in your health, and be sure to contact your doctor if:  ? · Your blood sugar stays outside the level your doctor set for you. ? · You have any problems. Where can you learn more? Go to https://[employer].Tip or Skip. org and sign in to your [email or web address] account. Enter M214 in the [facility] box to learn more about \"Diabetes Sick-Day Plan: Care Instructions. \"     If you do not have an account, please click on the \"Sign Up Now\" link. Current as of: March 13, 2017  Content Version: 11.4  © 5135-5931 Healthwise, Incorporated. Care instructions adapted under license by [facility] ([facility]). If you have questions about a medical condition or this instruction, always ask your healthcare professional. [address] any warranty or liability for your use of this information.
Alert-The patient is alert, awake and responds to voice. The patient is oriented to time, place, and person. The triage nurse is able to obtain subjective information.

## 2022-11-08 NOTE — ED ADULT NURSE NOTE - OBJECTIVE STATEMENT
Patient came from home with complaint of SOB, RA 96%. Patient was tested positive for COVID+ 18 days ago and completed her Paxlovid medication. Patient has a hx of asthma and used her nebulizer in AM however complaint of SOB. Denies any pain, fever, chills or headache.

## 2022-11-08 NOTE — ED PROVIDER NOTE - NS ED ATTENDING STATEMENT MOD
This was a shared visit with the SERGIO. I reviewed and verified the documentation and independently performed the documented:

## 2022-11-14 NOTE — CHART NOTE - NSCHARTNOTEFT_GEN_A_CORE
SW placed call to patient to discuss and assist with follow up care.  Patient presented to ED on 11/8/22 for SOB.  Patient reports that she completed her chest CT today and will be following up with pulmonologist on Wednesday 11/16/22. Declined needing further assistance, SW encouraged patient to call ED SW if further SW assistance is needed.

## 2022-11-21 ENCOUNTER — APPOINTMENT (OUTPATIENT)
Dept: MAMMOGRAPHY | Facility: HOSPITAL | Age: 77
End: 2022-11-21

## 2022-11-21 ENCOUNTER — APPOINTMENT (OUTPATIENT)
Dept: ULTRASOUND IMAGING | Facility: HOSPITAL | Age: 77
End: 2022-11-21

## 2022-11-21 ENCOUNTER — OUTPATIENT (OUTPATIENT)
Dept: OUTPATIENT SERVICES | Facility: HOSPITAL | Age: 77
LOS: 1 days | End: 2022-11-21
Payer: MEDICARE

## 2022-11-21 DIAGNOSIS — Z00.8 ENCOUNTER FOR OTHER GENERAL EXAMINATION: ICD-10-CM

## 2022-11-21 PROCEDURE — 76641 ULTRASOUND BREAST COMPLETE: CPT | Mod: 26,50

## 2022-11-21 PROCEDURE — 77063 BREAST TOMOSYNTHESIS BI: CPT

## 2022-11-21 PROCEDURE — 77063 BREAST TOMOSYNTHESIS BI: CPT | Mod: 26

## 2022-11-21 PROCEDURE — 77067 SCR MAMMO BI INCL CAD: CPT | Mod: 26

## 2022-11-21 PROCEDURE — 77067 SCR MAMMO BI INCL CAD: CPT

## 2022-11-21 PROCEDURE — 76641 ULTRASOUND BREAST COMPLETE: CPT

## 2022-12-19 ENCOUNTER — EMERGENCY (EMERGENCY)
Facility: HOSPITAL | Age: 77
LOS: 1 days | Discharge: HOME | End: 2022-12-19
Admitting: EMERGENCY MEDICINE
Payer: MEDICARE

## 2022-12-19 PROCEDURE — L9992: CPT

## 2022-12-20 ENCOUNTER — TRANSCRIPTION ENCOUNTER (OUTPATIENT)
Age: 77
End: 2022-12-20

## 2022-12-20 PROCEDURE — 94640 AIRWAY INHALATION TREATMENT: CPT

## 2022-12-20 PROCEDURE — 83735 ASSAY OF MAGNESIUM: CPT

## 2022-12-20 PROCEDURE — 85379 FIBRIN DEGRADATION QUANT: CPT

## 2022-12-20 PROCEDURE — 71045 X-RAY EXAM CHEST 1 VIEW: CPT

## 2022-12-20 PROCEDURE — 36415 COLL VENOUS BLD VENIPUNCTURE: CPT

## 2022-12-20 PROCEDURE — 80053 COMPREHEN METABOLIC PANEL: CPT

## 2022-12-20 PROCEDURE — 93005 ELECTROCARDIOGRAM TRACING: CPT

## 2022-12-20 PROCEDURE — 99285 EMERGENCY DEPT VISIT HI MDM: CPT | Mod: 25

## 2022-12-20 PROCEDURE — 93306 TTE W/DOPPLER COMPLETE: CPT

## 2022-12-20 PROCEDURE — 85025 COMPLETE CBC W/AUTO DIFF WBC: CPT

## 2022-12-20 PROCEDURE — 86803 HEPATITIS C AB TEST: CPT

## 2022-12-20 PROCEDURE — 70450 CT HEAD/BRAIN W/O DYE: CPT | Mod: MA

## 2022-12-20 PROCEDURE — 93880 EXTRACRANIAL BILAT STUDY: CPT

## 2022-12-20 PROCEDURE — 0225U NFCT DS DNA&RNA 21 SARSCOV2: CPT

## 2022-12-20 PROCEDURE — 80061 LIPID PANEL: CPT

## 2022-12-20 PROCEDURE — 84484 ASSAY OF TROPONIN QUANT: CPT

## 2023-01-03 ENCOUNTER — APPOINTMENT (OUTPATIENT)
Dept: ORTHOPEDIC SURGERY | Facility: CLINIC | Age: 78
End: 2023-01-03

## 2023-01-03 NOTE — ED PROCEDURE NOTE - CPROC ED INFORMED CONSENT1
How Severe Is Your Skin Lesion?: mild
Is This A New Presentation, Or A Follow-Up?: Skin Lesion
Benefits, risks, and possible complications of procedure explained to patient/caregiver who verbalized understanding and gave verbal consent.

## 2023-01-05 ENCOUNTER — APPOINTMENT (OUTPATIENT)
Dept: CARDIOLOGY | Facility: CLINIC | Age: 78
End: 2023-01-05
Payer: MEDICARE

## 2023-01-05 ENCOUNTER — NON-APPOINTMENT (OUTPATIENT)
Age: 78
End: 2023-01-05

## 2023-01-05 VITALS
HEIGHT: 64 IN | TEMPERATURE: 97.2 F | BODY MASS INDEX: 22.88 KG/M2 | OXYGEN SATURATION: 97 % | RESPIRATION RATE: 16 BRPM | SYSTOLIC BLOOD PRESSURE: 139 MMHG | DIASTOLIC BLOOD PRESSURE: 82 MMHG | HEART RATE: 70 BPM | WEIGHT: 134 LBS

## 2023-01-05 DIAGNOSIS — R94.31 ABNORMAL ELECTROCARDIOGRAM [ECG] [EKG]: ICD-10-CM

## 2023-01-05 PROCEDURE — 99213 OFFICE O/P EST LOW 20 MIN: CPT

## 2023-01-05 PROCEDURE — 93000 ELECTROCARDIOGRAM COMPLETE: CPT

## 2023-01-05 RX ORDER — ASPIRIN 81 MG
81 TABLET, DELAYED RELEASE (ENTERIC COATED) ORAL
Refills: 0 | Status: ACTIVE | COMMUNITY

## 2023-01-05 NOTE — ASSESSMENT
[FreeTextEntry1] : Assessment:\par Makenzie Walters is a 77 year old woman with past medical history of Lung cancer (s/p right lung lobectomy, in remission), Asthma and prior COVID-19 infection with recent hospitalization at [facility] for syncope presents for follow-up visit.\par \par The patient reports that she was preparing to brush her teeth in the bathroom and then woke up on the floor and hit her head, her spouse found her on the floor and she believes that she passed out for a few seconds, did admit to drinking small amount of alcohol prior to the event. Since hospital discharge she denies recurrent syncope but has had episodes of "disorientation" such as forgetting where the gas pedal is to drive. Denies angina or dyspnea. Home BP log with BP within normal limits. ECG consistent with sinus rhythm and nonspecific ST abnormalities. Recent echocardiogram (12/2022) consistent with normal LVEF 60-65%, mild LVH, normal RV size and function, no significant valvular disease and small pericardial effusion. Carotid US (12/2022) with no significant stenosis. \par \par Recommendations:\par [] Syncope: Has not recurred, unclear if was related to alcohol use. However, patient now reporting "disorientation" and is undergoing a neurologic workup including MRI brain, as long as no acute neurologic findings, will plan for pharmacologic nuclear stress test due to abnormal EKG and syncope history in 1 month. Will also check 1 week cardiac event monitor to evaluate for arrhythmia. \par [] Return to office: 6 weeks

## 2023-01-05 NOTE — REVIEW OF SYSTEMS
[Confusion] : confusion was observed [Headache] : no headache [Blurry Vision] : no blurred vision [SOB] : no shortness of breath [Chest Discomfort] : no chest discomfort [Lower Ext Edema] : no extremity edema [Cough] : no cough [Abdominal Pain] : no abdominal pain [Joint Pain] : no joint pain [Rash] : no rash [Easy Bruising] : no tendency for easy bruising

## 2023-01-05 NOTE — CARDIOLOGY SUMMARY
[de-identified] : ECG (12/19/22) at [facility]: sinus rhythm, left axis deviation, poor R wave progression (similar to prior ECG)\par ECG (1/5/23): sinus rhythm, left axis deviation, nonspecific ST abnormalities  [de-identified] : TTE (12/2022): LVEF 60-65%, mild LVH. Normal RV size and function, possible moderator band in RV apex. Mild TR. No aortic valve stenosis. Mild MD. Small pericardial effusion.  [de-identified] : Carotid US (12/2022): No significant hemodynamic stenosis of either carotid artery.

## 2023-01-05 NOTE — PHYSICAL EXAM
[Normal Conjunctiva] : normal conjunctiva [Normal] : no edema, no cyanosis, no clubbing, no varicosities [de-identified] : no acute distress [de-identified] : supple, no carotid bruits b/l [de-identified] : JVP ~ 7 cm H20, RRR, s1, s2, no murmurs/rubs [de-identified] : unlabored respirations, clear lung fields [de-identified] : non-distended

## 2023-01-05 NOTE — HISTORY OF PRESENT ILLNESS
[FreeTextEntry1] : Makenzie Walters is a 77 year old woman with past medical history of Lung cancer (s/p right lung lobectomy, in remission), Asthma and prior COVID-19 infection with recent hospitalization at [facility] for syncope presents for follow-up visit.\par \par The patient reports that she was preparing to brush her teeth in the bathroom and then woke up on the floor and hit her head, her spouse found her on the floor and she believes that she passed out for a few seconds, did admit to drinking small amount of alcohol prior to the event. During hospitalization the patient had negative troponins, telemetry with no arrhythmias or conduction disease and CT head with no acute findings. She reports that she has not had recurrent syncope but has had episodes of disorientation, such as forgetting where the gas pedal is to drive. Reports that she was evaluated by a neurologist yesterday and is scheduled for an MRI brain tomorrow. Denies chest pain or shortness of breath. Denies prior history of myocardial infarction.

## 2023-01-09 ENCOUNTER — APPOINTMENT (OUTPATIENT)
Dept: CARDIOLOGY | Facility: CLINIC | Age: 78
End: 2023-01-09
Payer: MEDICARE

## 2023-01-09 PROCEDURE — 93246 EXT ECG>7D<15D RECORDING: CPT

## 2023-01-19 ENCOUNTER — NON-APPOINTMENT (OUTPATIENT)
Age: 78
End: 2023-01-19

## 2023-01-19 PROCEDURE — 93248 EXT ECG>7D<15D REV&INTERPJ: CPT

## 2023-03-02 ENCOUNTER — APPOINTMENT (OUTPATIENT)
Dept: CARDIOLOGY | Facility: CLINIC | Age: 78
End: 2023-03-02
Payer: MEDICARE

## 2023-03-02 PROCEDURE — A9500: CPT

## 2023-03-02 PROCEDURE — 78452 HT MUSCLE IMAGE SPECT MULT: CPT

## 2023-03-02 PROCEDURE — 93015 CV STRESS TEST SUPVJ I&R: CPT

## 2023-03-28 ENCOUNTER — NON-APPOINTMENT (OUTPATIENT)
Age: 78
End: 2023-03-28

## 2023-03-30 ENCOUNTER — NON-APPOINTMENT (OUTPATIENT)
Age: 78
End: 2023-03-30

## 2023-03-30 ENCOUNTER — APPOINTMENT (OUTPATIENT)
Dept: CARDIOLOGY | Facility: CLINIC | Age: 78
End: 2023-03-30
Payer: MEDICARE

## 2023-03-30 VITALS
HEART RATE: 67 BPM | BODY MASS INDEX: 22.88 KG/M2 | DIASTOLIC BLOOD PRESSURE: 72 MMHG | WEIGHT: 134 LBS | TEMPERATURE: 97.2 F | OXYGEN SATURATION: 98 % | HEIGHT: 64 IN | SYSTOLIC BLOOD PRESSURE: 128 MMHG

## 2023-03-30 DIAGNOSIS — Z87.898 PERSONAL HISTORY OF OTHER SPECIFIED CONDITIONS: ICD-10-CM

## 2023-03-30 PROCEDURE — 93000 ELECTROCARDIOGRAM COMPLETE: CPT

## 2023-03-30 PROCEDURE — 99213 OFFICE O/P EST LOW 20 MIN: CPT

## 2023-03-30 NOTE — REVIEW OF SYSTEMS
[Headache] : no headache [Feeling Fatigued] : not feeling fatigued [Blurry Vision] : no blurred vision [SOB] : no shortness of breath [Chest Discomfort] : no chest discomfort [Lower Ext Edema] : no extremity edema [Cough] : no cough [Abdominal Pain] : no abdominal pain [Joint Pain] : no joint pain [Rash] : no rash [Confusion] : no confusion was observed [Easy Bruising] : no tendency for easy bruising

## 2023-03-30 NOTE — CARDIOLOGY SUMMARY
[de-identified] : ECG (12/19/22) at [facility]: sinus rhythm, left axis deviation, poor R wave progression (similar to prior ECG)\par ECG (1/5/23): sinus rhythm, left axis deviation, nonspecific ST abnormalities \par ECG (3/30/23): sinus rhythm, left axis deviation  [de-identified] : Cardiac event monitor: Normal sinus rhythm. One episode of SVT (6 beats), ventricular bigeminy was present. No VT, Pauses, AV block or Atrial fibrillation detected.  [de-identified] : Nuclear stress test (3/2023): Normal myocardial perfusion. Artifact noted. Post stress LVEF 77%.  [de-identified] : TTE (12/2022): LVEF 60-65%, mild LVH. Normal RV size and function, possible moderator band in RV apex. Mild TR. No aortic valve stenosis. Mild MI. Small pericardial effusion.  [de-identified] : Carotid US (12/2022): No significant hemodynamic stenosis of either carotid artery.

## 2023-03-30 NOTE — PHYSICAL EXAM
[Normal Conjunctiva] : normal conjunctiva [Normal] : no edema, no cyanosis, no clubbing, no varicosities [de-identified] : no acute distress [de-identified] : supple [de-identified] : JVP ~ 7 cm H20, RRR, s1, s2, no murmurs/rubs [de-identified] : unlabored respirations, clear lung fields [de-identified] : non-distended

## 2023-03-30 NOTE — HISTORY OF PRESENT ILLNESS
[FreeTextEntry1] : Makenzie Walters is a 77 year old woman with past medical history of Lung cancer (s/p right lung lobectomy, in remission), Asthma and prior COVID-19 infection with recent hospitalization at [facility] for syncope presents for follow-up visit.\par \par Since her last visit she reports feeling well, has not had recurrent syncope. Occasionally reports difficulties with gait and balance issues but reports that her neurologist thinks her symptoms are due to long COVID symptoms. She denies exertional chest pain, shortness of breath or palpitations.

## 2023-03-30 NOTE — ASSESSMENT
[FreeTextEntry1] : Assessment:\par Makenzie Walters is a 77 year old woman with past medical history of Lung cancer (s/p right lung lobectomy, in remission), Asthma and prior COVID-19 infection with recent hospitalization at [facility] for syncope presents for follow-up visit.\par \par Since her last visit she reports feeling well, has not had recurrent syncope. Occasionally reports difficulties with gait and balance issues but reports that her neurologist believes this is secondary to long COVID. Otherwise, the patient denies angina, dyspnea or palpitations. ECG today consistent with normal sinus rhythm and left axis deviation, no acute ST changes. Echocardiogram (12/2022) consistent with normal LVEF 60-65%, mild LVH, normal RV size and function, no significant valvular disease and small pericardial effusion. Carotid US (12/2022) with no significant stenosis. Nuclear stress test (3/2023) consistent with normal myocardial perfusion with normal post stress LVEF. Cardiac event monitor with no concerning arrhythmias, only brief SVT noted. Symptoms appear unlikely to be cardiac in etiology.\par \par Recommendations:\par [] Syncope: No cardiac cause of prior episode of syncope found. Patient reports resolution of symptoms, she will continue to monitor for symptom recurrence and will inform office if any recurrent symptoms. Recommend follow up with neurology. \par [] Return to office: as needed, otherwise the patient should follow up with PCP

## 2023-04-17 ENCOUNTER — NON-APPOINTMENT (OUTPATIENT)
Age: 78
End: 2023-04-17

## 2023-05-12 ENCOUNTER — EMERGENCY (EMERGENCY)
Facility: HOSPITAL | Age: 78
LOS: 1 days | Discharge: HOME | End: 2023-05-12
Attending: EMERGENCY MEDICINE | Admitting: STUDENT IN AN ORGANIZED HEALTH CARE EDUCATION/TRAINING PROGRAM
Payer: MEDICARE

## 2023-05-12 VITALS
DIASTOLIC BLOOD PRESSURE: 82 MMHG | HEART RATE: 116 BPM | TEMPERATURE: 101 F | WEIGHT: 134.04 LBS | OXYGEN SATURATION: 94 % | RESPIRATION RATE: 19 BRPM | SYSTOLIC BLOOD PRESSURE: 128 MMHG | HEIGHT: 64 IN

## 2023-05-12 DIAGNOSIS — Z98.890 OTHER SPECIFIED POSTPROCEDURAL STATES: Chronic | ICD-10-CM

## 2023-05-12 LAB
ALBUMIN SERPL ELPH-MCNC: 3.3 G/DL — SIGNIFICANT CHANGE UP (ref 3.3–5)
ALP SERPL-CCNC: 91 U/L — SIGNIFICANT CHANGE UP (ref 40–120)
ALT FLD-CCNC: 44 U/L — SIGNIFICANT CHANGE UP (ref 10–45)
ANION GAP SERPL CALC-SCNC: 13 MMOL/L — SIGNIFICANT CHANGE UP (ref 5–17)
APPEARANCE UR: CLEAR — SIGNIFICANT CHANGE UP
APTT BLD: 26.2 SEC — LOW (ref 27.5–35.5)
AST SERPL-CCNC: 43 U/L — HIGH (ref 10–40)
BASOPHILS # BLD AUTO: 0.05 K/UL — SIGNIFICANT CHANGE UP (ref 0–0.2)
BASOPHILS NFR BLD AUTO: 0.5 % — SIGNIFICANT CHANGE UP (ref 0–2)
BILIRUB SERPL-MCNC: 0.3 MG/DL — SIGNIFICANT CHANGE UP (ref 0.2–1.2)
BILIRUB UR-MCNC: NEGATIVE — SIGNIFICANT CHANGE UP
BUN SERPL-MCNC: 21 MG/DL — SIGNIFICANT CHANGE UP (ref 7–23)
CALCIUM SERPL-MCNC: 9.1 MG/DL — SIGNIFICANT CHANGE UP (ref 8.4–10.5)
CHLORIDE SERPL-SCNC: 102 MMOL/L — SIGNIFICANT CHANGE UP (ref 96–108)
CO2 SERPL-SCNC: 24 MMOL/L — SIGNIFICANT CHANGE UP (ref 22–31)
COLOR SPEC: YELLOW — SIGNIFICANT CHANGE UP
CREAT SERPL-MCNC: 0.55 MG/DL — SIGNIFICANT CHANGE UP (ref 0.5–1.3)
DIFF PNL FLD: NEGATIVE — SIGNIFICANT CHANGE UP
EGFR: 94 ML/MIN/1.73M2 — SIGNIFICANT CHANGE UP
EOSINOPHIL # BLD AUTO: 0.18 K/UL — SIGNIFICANT CHANGE UP (ref 0–0.5)
EOSINOPHIL NFR BLD AUTO: 1.7 % — SIGNIFICANT CHANGE UP (ref 0–6)
GLUCOSE SERPL-MCNC: 129 MG/DL — HIGH (ref 70–99)
GLUCOSE UR QL: NEGATIVE MG/DL — SIGNIFICANT CHANGE UP
HCT VFR BLD CALC: 38.1 % — SIGNIFICANT CHANGE UP (ref 34.5–45)
HGB BLD-MCNC: 12.5 G/DL — SIGNIFICANT CHANGE UP (ref 11.5–15.5)
IMM GRANULOCYTES NFR BLD AUTO: 0.4 % — SIGNIFICANT CHANGE UP (ref 0–0.9)
INR BLD: 1.05 RATIO — SIGNIFICANT CHANGE UP (ref 0.88–1.16)
KETONES UR-MCNC: ABNORMAL MG/DL
LACTATE SERPL-SCNC: 1.6 MMOL/L — SIGNIFICANT CHANGE UP (ref 0.7–2)
LEUKOCYTE ESTERASE UR-ACNC: NEGATIVE — SIGNIFICANT CHANGE UP
LYMPHOCYTES # BLD AUTO: 2.2 K/UL — SIGNIFICANT CHANGE UP (ref 1–3.3)
LYMPHOCYTES # BLD AUTO: 20.8 % — SIGNIFICANT CHANGE UP (ref 13–44)
MCHC RBC-ENTMCNC: 30.3 PG — SIGNIFICANT CHANGE UP (ref 27–34)
MCHC RBC-ENTMCNC: 32.8 GM/DL — SIGNIFICANT CHANGE UP (ref 32–36)
MCV RBC AUTO: 92.3 FL — SIGNIFICANT CHANGE UP (ref 80–100)
MONOCYTES # BLD AUTO: 0.84 K/UL — SIGNIFICANT CHANGE UP (ref 0–0.9)
MONOCYTES NFR BLD AUTO: 7.9 % — SIGNIFICANT CHANGE UP (ref 2–14)
NEUTROPHILS # BLD AUTO: 7.27 K/UL — SIGNIFICANT CHANGE UP (ref 1.8–7.4)
NEUTROPHILS NFR BLD AUTO: 68.7 % — SIGNIFICANT CHANGE UP (ref 43–77)
NITRITE UR-MCNC: NEGATIVE — SIGNIFICANT CHANGE UP
NRBC # BLD: 0 /100 WBCS — SIGNIFICANT CHANGE UP (ref 0–0)
PH UR: 5.5 — SIGNIFICANT CHANGE UP (ref 5–8)
PLATELET # BLD AUTO: 214 K/UL — SIGNIFICANT CHANGE UP (ref 150–400)
POTASSIUM SERPL-MCNC: 3.9 MMOL/L — SIGNIFICANT CHANGE UP (ref 3.5–5.3)
POTASSIUM SERPL-SCNC: 3.9 MMOL/L — SIGNIFICANT CHANGE UP (ref 3.5–5.3)
PROT SERPL-MCNC: 6.7 G/DL — SIGNIFICANT CHANGE UP (ref 6–8.3)
PROT UR-MCNC: NEGATIVE MG/DL — SIGNIFICANT CHANGE UP
PROTHROM AB SERPL-ACNC: 12.2 SEC — SIGNIFICANT CHANGE UP (ref 10.5–13.4)
RAPID RVP RESULT: SIGNIFICANT CHANGE UP
RBC # BLD: 4.13 M/UL — SIGNIFICANT CHANGE UP (ref 3.8–5.2)
RBC # FLD: 12.6 % — SIGNIFICANT CHANGE UP (ref 10.3–14.5)
SARS-COV-2 RNA SPEC QL NAA+PROBE: SIGNIFICANT CHANGE UP
SODIUM SERPL-SCNC: 139 MMOL/L — SIGNIFICANT CHANGE UP (ref 135–145)
SP GR SPEC: 1.03 — SIGNIFICANT CHANGE UP (ref 1–1.03)
TROPONIN I, HIGH SENSITIVITY RESULT: 4.9 NG/L — SIGNIFICANT CHANGE UP
UROBILINOGEN FLD QL: 1 MG/DL — SIGNIFICANT CHANGE UP (ref 0.2–1)
WBC # BLD: 10.58 K/UL — HIGH (ref 3.8–10.5)
WBC # FLD AUTO: 10.58 K/UL — HIGH (ref 3.8–10.5)

## 2023-05-12 PROCEDURE — 71275 CT ANGIOGRAPHY CHEST: CPT | Mod: 26,MA

## 2023-05-12 PROCEDURE — 71045 X-RAY EXAM CHEST 1 VIEW: CPT | Mod: 26

## 2023-05-12 PROCEDURE — 99285 EMERGENCY DEPT VISIT HI MDM: CPT | Mod: FS

## 2023-05-12 PROCEDURE — 93010 ELECTROCARDIOGRAM REPORT: CPT

## 2023-05-12 RX ORDER — ACETAMINOPHEN 500 MG
650 TABLET ORAL ONCE
Refills: 0 | Status: COMPLETED | OUTPATIENT
Start: 2023-05-12 | End: 2023-05-12

## 2023-05-12 RX ORDER — AZITHROMYCIN 500 MG/1
500 TABLET, FILM COATED ORAL ONCE
Refills: 0 | Status: COMPLETED | OUTPATIENT
Start: 2023-05-12 | End: 2023-05-12

## 2023-05-12 RX ORDER — CEFEPIME 1 G/1
2000 INJECTION, POWDER, FOR SOLUTION INTRAMUSCULAR; INTRAVENOUS ONCE
Refills: 0 | Status: COMPLETED | OUTPATIENT
Start: 2023-05-12 | End: 2023-05-12

## 2023-05-12 RX ORDER — ALBUTEROL 90 UG/1
2.5 AEROSOL, METERED ORAL ONCE
Refills: 0 | Status: COMPLETED | OUTPATIENT
Start: 2023-05-12 | End: 2023-05-12

## 2023-05-12 RX ORDER — SODIUM CHLORIDE 9 MG/ML
1900 INJECTION INTRAMUSCULAR; INTRAVENOUS; SUBCUTANEOUS ONCE
Refills: 0 | Status: COMPLETED | OUTPATIENT
Start: 2023-05-12 | End: 2023-05-12

## 2023-05-12 RX ADMIN — SODIUM CHLORIDE 1900 MILLILITER(S): 9 INJECTION INTRAMUSCULAR; INTRAVENOUS; SUBCUTANEOUS at 19:50

## 2023-05-12 RX ADMIN — Medication 650 MILLIGRAM(S): at 19:23

## 2023-05-12 RX ADMIN — ALBUTEROL 2.5 MILLIGRAM(S): 90 AEROSOL, METERED ORAL at 20:43

## 2023-05-12 RX ADMIN — CEFEPIME 2000 MILLIGRAM(S): 1 INJECTION, POWDER, FOR SOLUTION INTRAMUSCULAR; INTRAVENOUS at 19:57

## 2023-05-12 RX ADMIN — AZITHROMYCIN 500 MILLIGRAM(S): 500 TABLET, FILM COATED ORAL at 23:17

## 2023-05-12 RX ADMIN — Medication 650 MILLIGRAM(S): at 20:23

## 2023-05-12 RX ADMIN — CEFEPIME 100 MILLIGRAM(S): 1 INJECTION, POWDER, FOR SOLUTION INTRAMUSCULAR; INTRAVENOUS at 19:27

## 2023-05-12 RX ADMIN — Medication 125 MILLIGRAM(S): at 20:43

## 2023-05-12 RX ADMIN — SODIUM CHLORIDE 1900 MILLILITER(S): 9 INJECTION INTRAMUSCULAR; INTRAVENOUS; SUBCUTANEOUS at 20:50

## 2023-05-12 NOTE — ED PROVIDER NOTE - PHYSICAL EXAMINATION
Gen: Well appearing in NAD.   ENT: oral mucosa moist   Head: atraumatic  Heart: s1/s2, RRR  Lung: CTA b/l, no wheezing/rhonchi or rales.   Abd: soft, NT/ND, no rebound or guarding, NCVAT  Msk: no pedal edema or calf pain,  Neuro: AAO x3, patient moving all extremity equally.  Skin: Normal for race.  Psych: Alert and oriented

## 2023-05-12 NOTE — ED PROVIDER NOTE - PATIENT PORTAL LINK FT
You can access the FollowMyHealth Patient Portal offered by [facility] by registering at the following website: [email or web address]. By joining [employer]’s FollowMyHealth portal, you will also be able to view your health information using other applications (apps) compatible with our system.

## 2023-05-12 NOTE — ED ADULT TRIAGE NOTE - CHIEF COMPLAINT QUOTE
Pt c/o flu like symptoms with fever /chills/sweating at night with low oxygen level at  87% since Sunday, rapid in home Covid test x 3 with negative results, took 2 Advil 12pm, h/o lung Ca with surgery 5 years ago

## 2023-05-12 NOTE — ED ADULT NURSE NOTE - IN THE PAST 12 MONTHS HAVE YOU USED DRUGS OTHER THAN THOSE REQUIRED FOR MEDICAL REASON?
What Type Of Note Output Would You Prefer (Optional)?: Standard Output
Hpi Title: Evaluation of a Skin Lesion
How Severe Are Your Spot(S)?: moderate
Have Your Spot(S) Been Treated In The Past?: has not been treated
No

## 2023-05-12 NOTE — ED PROVIDER NOTE - ATTENDING APP SHARED VISIT CONTRIBUTION OF CARE
Rosanna with [employer]. 77-year-old female with past medical history of asthma, lung cancer status post right lower lobectomy 5 years ago presents to the ED with complaints of sore throat, chills, cough x5 days, fever X yesterday.  Reports she felt mild shortness of breath, she checked her pulse ox and was 87% on room air today which is what brought her into the ED. [place] in [place] started.  She denies any vomiting diarrhea, abdominal pain, chest pain, calf pain, leg swelling or all other complaints. PE as noted above. ED sepsis w/u initiated, IVF bolus given, IV abx ordered. labs/imaging pending.     CAT scan performed to rule out PE and noted that there is no PE especially in light of recent travel.  Findings are suggestive of multifocal pneumonia as there are scattered opacities in the left lower lung fields.  Patient's walking O2 sat 92 to 93%.  Discussed with patient at length.  Patient is comfortable with going home.  Will recommend cephalosporin and macrolide for home use and patient has follow-up with her doctor on Wednesday where she will be following up.  A CD of the images were provided.  Patient has oximeter at home and will monitor.  Patient understands all reasons for return.    I performed a face to face bedside interview with patient regarding history of present illness, review of symptoms and past medical history. I completed an independent physical exam.  I have discussed the patient's plan of care with Physician Assistant (PA). I agree with note as stated above, having amended the EMR as needed to reflect my findings.   This includes History of Present Illness, HIV, Past Medical/Surgical/Family/Social History, Allergies and Home Medications, Review of Systems, Physical Exam, and any Progress Notes during the time I functioned as the attending physician for this patient.

## 2023-05-12 NOTE — ED ADULT NURSE NOTE - OBJECTIVE STATEMENT
Patient came from home with complaint of fever, chills and sweats since Sunday, patient states of a pulse ox 87% on RA, 94% on RA at this moment. Patient took at home COVID test which were negative three times. Patient has a hx of lung cancer about five years ago. Denies any chest pain, nausea, vomit, headache or syncope.

## 2023-05-12 NOTE — ED PROVIDER NOTE - CLINICAL SUMMARY MEDICAL DECISION MAKING FREE TEXT BOX
77-year-old female with past medical history of asthma, lung cancer status post right lower lobectomy 5 years ago presents to the ED with complaints of sore throat, chills, cough x5 days, fever X yesterday.  Reports she felt mild shortness of breath, she checked her pulse ox and was 87% on room air today which is what brought her into the ED. [place] started.  She denies any vomiting diarrhea, abdominal pain, chest pain, calf pain, leg swelling or all other complaints. PE as noted above. ED sepsis w/u initiated, IVF bolus given, IV abx ordered. labs/imaging pending 77-year-old female with past medical history of asthma, lung cancer status post right lower lobectomy 5 years ago presents to the ED with complaints of sore throat, chills, cough x5 days, fever X yesterday.  Reports she felt mild shortness of breath, she checked her pulse ox and was 87% on room air today which is what brought her into the ED. [place] was in [place] started.  She denies any vomiting diarrhea, abdominal pain, chest pain, calf pain, leg swelling or all other complaints. PE as noted above. ED sepsis w/u initiated, IVF bolus given, IV abx ordered. labs/imaging pending.     CAT scan performed to rule out PE and noted that there is no PE especially in light of recent travel.  Findings are suggestive of multifocal pneumonia as there are scattered opacities in the left lower lung fields.  Patient's walking O2 sat 92 to 93%.  Discussed with patient at length.  Patient is comfortable with going home.  Will recommend cephalosporin and macrolide for home use and patient has follow-up with her doctor on Wednesday where she will be following up.  A CD of the images were provided.  Patient has oximeter at home and will monitor.  Patient understands all reasons for return. 77-year-old female with past medical history of asthma, lung cancer status post right lower lobectomy 5 years ago presents to the ED with complaints of sore throat, chills, cough x5 days, fever X yesterday.  Reports she felt mild shortness of breath, she checked her pulse ox and was 87% on room air today which is what brought her into the ED. [place] started.  She denies any vomiting diarrhea, abdominal pain, chest pain, calf pain, leg swelling or all other complaints. PE as noted above. ED sepsis w/u initiated, IVF bolus given, IV abx ordered. labs/imaging pending.     CAT scan performed to rule out PE and noted that there is no PE especially in light of recent travel.  Findings are suggestive of multifocal pneumonia as there are scattered opacities in the left lower lung fields.  Patient's walking O2 sat 92 to 93%.  Discussed with patient at length.  Patient is comfortable with going home.  Will recommend cephalosporin and macrolide for home use and patient has follow-up with her doctor on Wednesday where she will be following up.  A CD of the images were provided.  Patient has oximeter at home and will monitor.  Patient understands all reasons for return.    Pt did well with CT with IV contrast. Given albuterol and solumedrol just prior to CT> Pt had no adverse events. Observed for a duration of 90 minutes post CT.   Worsening, continued or ANY new concerning symptoms return to the emergency department.

## 2023-05-12 NOTE — ED PROVIDER NOTE - OBJECTIVE STATEMENT
77-year-old female with past medical history of asthma, lung cancer status post right lower lobectomy 5 years ago presents to the ED with complaints of sore throat, chills, cough x5 days, fever X yesterday.  Reports she felt mild shortness of breath, she checked her pulse ox and was 87% on room air today which is what brought her into the ED. [place] started.  She denies any vomiting diarrhea, abdominal pain, chest pain, calf pain, leg swelling or all other complaints

## 2023-05-12 NOTE — ED PROVIDER NOTE - NSDCPRINTRESULTS_ED_ALL_ED
After Visit Summary   3/6/2018    Tin Langley    MRN: [id number]           Patient Information     Date Of Birth          1955        Visit Information        Provider Department      3/6/2018 1:15 PM Rafi Lauren MD [facility]ose [facility] Thro[facility]        Today's Diagnoses     Infection of mandible    -  1      Care Instructions    The plan will be to extend your antibiotics for 2 more weeks. We will also get a CT scan of the jaw to make sure there is no involvement. Once the scan is reviewed with Dr Lauren we will call with the plan.  Olvin JonesRN  [phone]              Follow-ups after your visit        Follow-up notes from your care team     Return in about 1 year (around 3/6/2019).      Who to contact     Please call your clinic at [phone] to:    Ask questions about your health    Make or cancel appointments    Discuss your medicines    Learn about your test results    Speak to your doctor            Additional Information About Your Visit        [employer] Information     [employer] gives you secure access to your electronic health record. If you see a primary care provider, you can also send messages to your care team and make appointments. If you have questions, please call your primary care clinic.  If you do not have a primary care provider, please call [phone] and they will assist you.      [employer] is an electronic gateway that provides easy, online access to your medical records. With [employer], you can request a clinic appointment, read your test results, renew a prescription or communicate with your care team.     To access your existing account, please contact your [facility] or call [phone] for assistance.        Care EveryWhere ID     This is your Care EveryWhere ID. This could be used by other organizations to access your [place] medical records  [id number]        Your Vitals Were     Height BMI (Body Mass Index)     "            1.79 m (5' 10.47\") 26.33 kg/m2           Blood Pressure from Last 3 Encounters:   No data found for BP    Weight from Last 3 Encounters:   03/06/18 84.4 kg (186 lb)   06/13/17 83.9 kg (185 lb)   03/20/15 85.8 kg (189 lb 3.2 oz)              We Performed the Following     LARYNGOSCOPY FLEX FIBEROPTIC, DIAGNOSTIC          Today's Medication Changes          These changes are accurate as of 3/6/18 11:59 PM.  If you have any questions, ask your nurse or doctor.               Start taking these medicines.        Dose/Directions    clindamycin 300 MG capsule   Commonly known as:  CLEOCIN   Used for:  Infection of mandible   Started by:  Rafi Lauren MD        Dose:  300 mg   Take 1 capsule (300 mg) by mouth 3 times daily for 14 days   Quantity:  42 capsule   Refills:  0            Where to get your medicines      [facility] [place] - [address]  [address] [address]     Phone:  [phone]     clindamycin 300 MG capsule                Primary Care Provider Office Phone # Fax #    Neel Garg [phone]       [facility] [address] [facility] [address]        Equal Access to Services     EMEKA CHRISTIANSEN AH: Hadii jey doyle hadasho Soomaali, waaxda luqadaha, qaybta kaalmada adeegyada, waxay nicolein haynarda rubi. So [facility] [phone].    ATENCIÓN: Si habla español, tiene a meyer disposición servicios gratuitos de asistencia lingüística. Llame al [phone].    We comply with applicable federal civil rights laws and [place] laws. We do not discriminate on the basis of race, color, national origin, age, disability, sex, sexual orientation, or gender identity.            Thank you!     Thank you for choosing [facility] E[facility]SE [facility]  for your care. Our goal is always to provide you with excellent care. Hearing back from our patients is one way we can continue to improve our " services. Please take a few minutes to complete the written survey that you may receive in the mail after your visit with us. Thank you!             Your Updated Medication List - Protect others around you: Learn how to safely use, store and throw away your medicines at www.disposemymeds.org.          This list is accurate as of 3/6/18 11:59 PM.  Always use your most recent med list.                   Brand Name Dispense Instructions for use Diagnosis    clindamycin 300 MG capsule    CLEOCIN    42 capsule    Take 1 capsule (300 mg) by mouth 3 times daily for 14 days    Infection of mandible       MULTIVITAMIN ADULTS 50+ Tabs           NO ACTIVE MEDICATIONS              Patient requests all Lab, Cardiology, and Radiology Results on their Discharge Instructions

## 2023-05-12 NOTE — ED ADULT NURSE NOTE - NSFALLUNIVINTERV_ED_ALL_ED
Bed/Stretcher in lowest position, wheels locked, appropriate side rails in place/Call bell, personal items and telephone in reach/Instruct patient to call for assistance before getting out of bed/chair/stretcher/Non-slip footwear applied when patient is off stretcher/[place] to call system/Physically safe environment - no spills, clutter or unnecessary equipment/Purposeful proactive rounding/Room/bathroom lighting operational, light cord in reach

## 2023-05-13 VITALS
HEART RATE: 92 BPM | RESPIRATION RATE: 18 BRPM | DIASTOLIC BLOOD PRESSURE: 74 MMHG | SYSTOLIC BLOOD PRESSURE: 125 MMHG | OXYGEN SATURATION: 94 %

## 2023-05-13 PROCEDURE — 85730 THROMBOPLASTIN TIME PARTIAL: CPT

## 2023-05-13 PROCEDURE — 96361 HYDRATE IV INFUSION ADD-ON: CPT

## 2023-05-13 PROCEDURE — 0225U NFCT DS DNA&RNA 21 SARSCOV2: CPT

## 2023-05-13 PROCEDURE — 85610 PROTHROMBIN TIME: CPT

## 2023-05-13 PROCEDURE — 94640 AIRWAY INHALATION TREATMENT: CPT

## 2023-05-13 PROCEDURE — 93005 ELECTROCARDIOGRAM TRACING: CPT

## 2023-05-13 PROCEDURE — 36415 COLL VENOUS BLD VENIPUNCTURE: CPT

## 2023-05-13 PROCEDURE — 80053 COMPREHEN METABOLIC PANEL: CPT

## 2023-05-13 PROCEDURE — 99285 EMERGENCY DEPT VISIT HI MDM: CPT | Mod: 25

## 2023-05-13 PROCEDURE — 84484 ASSAY OF TROPONIN QUANT: CPT

## 2023-05-13 PROCEDURE — 87086 URINE CULTURE/COLONY COUNT: CPT

## 2023-05-13 PROCEDURE — 71045 X-RAY EXAM CHEST 1 VIEW: CPT

## 2023-05-13 PROCEDURE — 85025 COMPLETE CBC W/AUTO DIFF WBC: CPT

## 2023-05-13 PROCEDURE — 71275 CT ANGIOGRAPHY CHEST: CPT | Mod: MA

## 2023-05-13 PROCEDURE — 87040 BLOOD CULTURE FOR BACTERIA: CPT

## 2023-05-13 PROCEDURE — 96375 TX/PRO/DX INJ NEW DRUG ADDON: CPT

## 2023-05-13 PROCEDURE — 96365 THER/PROPH/DIAG IV INF INIT: CPT

## 2023-05-13 PROCEDURE — 83605 ASSAY OF LACTIC ACID: CPT

## 2023-05-13 RX ORDER — CEFUROXIME AXETIL 250 MG
1 TABLET ORAL
Qty: 14 | Refills: 0
Start: 2023-05-13 | End: 2023-05-19

## 2023-05-13 RX ORDER — AZITHROMYCIN 500 MG/1
1 TABLET, FILM COATED ORAL
Qty: 4 | Refills: 0
Start: 2023-05-13 | End: 2023-05-16

## 2023-05-14 LAB
CULTURE RESULTS: NO GROWTH — SIGNIFICANT CHANGE UP
SPECIMEN SOURCE: SIGNIFICANT CHANGE UP

## 2023-05-16 ENCOUNTER — APPOINTMENT (OUTPATIENT)
Dept: ULTRASOUND IMAGING | Facility: HOSPITAL | Age: 78
End: 2023-05-16

## 2023-05-16 ENCOUNTER — APPOINTMENT (OUTPATIENT)
Dept: MAMMOGRAPHY | Facility: HOSPITAL | Age: 78
End: 2023-05-16

## 2023-05-16 NOTE — CHART NOTE - NSCHARTNOTEFT_GEN_A_CORE
SW contacted patient to assist in f/u care services. Patient is a 78 y/o female admitted to [facility] for pneumonia. Patient was recommended to f/u with her community PCP. SW offered to schedule PCP appt for pt, however, pt declined assistance at this time. SW assessed pt safety and support at home-Patient denied any safety concerns and/or need for resources. MEL advised pt to f/u with SW for further assistance and/or resources.

## 2023-05-18 LAB
CULTURE RESULTS: SIGNIFICANT CHANGE UP
CULTURE RESULTS: SIGNIFICANT CHANGE UP
SPECIMEN SOURCE: SIGNIFICANT CHANGE UP
SPECIMEN SOURCE: SIGNIFICANT CHANGE UP

## 2023-06-08 ENCOUNTER — EMERGENCY (EMERGENCY)
Facility: HOSPITAL | Age: 78
LOS: 1 days | Discharge: HOME | End: 2023-06-08
Attending: EMERGENCY MEDICINE | Admitting: EMERGENCY MEDICINE
Payer: MEDICARE

## 2023-06-08 VITALS
DIASTOLIC BLOOD PRESSURE: 79 MMHG | HEART RATE: 79 BPM | RESPIRATION RATE: 18 BRPM | OXYGEN SATURATION: 98 % | SYSTOLIC BLOOD PRESSURE: 140 MMHG

## 2023-06-08 VITALS
WEIGHT: 138.01 LBS | HEART RATE: 83 BPM | RESPIRATION RATE: 20 BRPM | OXYGEN SATURATION: 97 % | SYSTOLIC BLOOD PRESSURE: 145 MMHG | DIASTOLIC BLOOD PRESSURE: 84 MMHG | TEMPERATURE: 98 F | HEIGHT: 64 IN

## 2023-06-08 DIAGNOSIS — Z98.890 OTHER SPECIFIED POSTPROCEDURAL STATES: Chronic | ICD-10-CM

## 2023-06-08 PROCEDURE — 99284 EMERGENCY DEPT VISIT MOD MDM: CPT

## 2023-06-08 PROCEDURE — 94640 AIRWAY INHALATION TREATMENT: CPT

## 2023-06-08 PROCEDURE — 71045 X-RAY EXAM CHEST 1 VIEW: CPT | Mod: 26

## 2023-06-08 PROCEDURE — 99283 EMERGENCY DEPT VISIT LOW MDM: CPT | Mod: 25

## 2023-06-08 PROCEDURE — 71045 X-RAY EXAM CHEST 1 VIEW: CPT

## 2023-06-08 RX ORDER — ALBUTEROL 90 UG/1
2 AEROSOL, METERED ORAL
Qty: 1 | Refills: 0
Start: 2023-06-08 | End: 2023-06-14

## 2023-06-08 RX ORDER — ALBUTEROL 90 UG/1
3 AEROSOL, METERED ORAL
Qty: 9 | Refills: 0
Start: 2023-06-08 | End: 2023-06-14

## 2023-06-08 RX ORDER — IPRATROPIUM/ALBUTEROL SULFATE 18-103MCG
3 AEROSOL WITH ADAPTER (GRAM) INHALATION ONCE
Refills: 0 | Status: COMPLETED | OUTPATIENT
Start: 2023-06-08 | End: 2023-06-08

## 2023-06-08 RX ADMIN — Medication 40 MILLIGRAM(S): at 00:42

## 2023-06-08 RX ADMIN — Medication 3 MILLILITER(S): at 00:42

## 2023-06-08 NOTE — ED PROVIDER NOTE - PATIENT PORTAL LINK FT
You can access the FollowMyHealth Patient Portal offered by [facility] by registering at the following website: [email or web address]. By joining [employer]’s FollowMyHealth portal, you will also be able to view your health information using other applications (apps) compatible with our system.

## 2023-06-08 NOTE — ED PROVIDER NOTE - NSFOLLOWUPINSTRUCTIONS_ED_ALL_ED_FT
Asthma    Asthma is a condition in which the airways tighten and narrow, making it difficult to breath. Asthma episodes, also called asthma attacks, range from minor to life-threatening. Symptoms include wheezing, coughing, chest tightness, or shortness of breath. The diagnosis of asthma is made by a review of your medical history and a physical exam, but may involve additional testing. Asthma cannot be cured, but medicines and lifestyle changes can help control it. Avoid triggers of asthma which may include animal dander, pollen, mold, smoke, air pollutants, etc.     Use your albuterol nebulizer or inhaler every 4-6 hours as needed for shortness of breath wheeze or cough.  Make sure to follow-up with your primary doctor and/or any scheduled upcoming imaging studies.  Take prednisone 40 mg once a day for the next 4 days.    SEEK IMMEDIATE MEDICAL CARE IF YOU HAVE ANY OF THE FOLLOWING SYMPTOMS: worsening of symptoms, shortness of breath at rest, chest pain, bluish discoloration to lips or fingertips, lightheadedness/dizziness, or fever. Asthma    Asthma is a condition in which the airways tighten and narrow, making it difficult to breath. Asthma episodes, also called asthma attacks, range from minor to life-threatening. Symptoms include wheezing, coughing, chest tightness, or shortness of breath. The diagnosis of asthma is made by a review of your medical history and a physical exam, but may involve additional testing. Asthma cannot be cured, but medicines and lifestyle changes can help control it. Avoid triggers of asthma which may include animal dander, pollen, mold, smoke, air pollutants, etc.     Due to the poor air quality, please use a high quality mask (ie: N95 mask) to protect yourself while outdoors.  Limit your time outdoors and stay inside when possible.    Use your albuterol nebulizer or inhaler every 4-6 hours as needed for shortness of breath wheeze or cough.  Make sure to follow-up with your primary doctor and/or any scheduled upcoming imaging studies.  Take prednisone 40 mg once a day for the next 4 days.    SEEK IMMEDIATE MEDICAL CARE IF YOU HAVE ANY OF THE FOLLOWING SYMPTOMS: worsening of symptoms, shortness of breath at rest, chest pain, bluish discoloration to lips or fingertips, lightheadedness/dizziness, or fever. Asthma    Asthma is a condition in which the airways tighten and narrow, making it difficult to breath. Asthma episodes, also called asthma attacks, range from minor to life-threatening. Symptoms include wheezing, coughing, chest tightness, or shortness of breath. The diagnosis of asthma is made by a review of your medical history and a physical exam, but may involve additional testing. Asthma cannot be cured, but medicines and lifestyle changes can help control it. Avoid triggers of asthma which may include animal dander, pollen, mold, smoke, air pollutants, etc.     Due to the poor air quality, please use a high quality mask (ie: N95 mask) to protect yourself while outdoors.  Limit your time outdoors and stay inside when possible.    Use your albuterol nebulizer or inhaler every 4-6 hours as needed for shortness of breath wheeze or cough.  Make sure to follow-up with your primary doctor and/or any scheduled upcoming imaging studies.  Take prednisone 40 mg once a day for the next 4 days.    In the Emergency Department today, we did not appreciate any abnormal findings on your xray. We will submit to our radiologist for FINAL review. If there are any new findings or concerning findings that were missed in the Emergency Department, we will notify you at the number provided upon registration.     SEEK IMMEDIATE MEDICAL CARE IF YOU HAVE ANY OF THE FOLLOWING SYMPTOMS: worsening of symptoms, shortness of breath at rest, chest pain, bluish discoloration to lips or fingertips, lightheadedness/dizziness, or fever.

## 2023-06-08 NOTE — ED ADULT NURSE NOTE - NSFALLUNIVINTERV_ED_ALL_ED
Bed/Stretcher in lowest position, wheels locked, appropriate side rails in place/Call bell, personal items and telephone in reach/Instruct patient to call for assistance before getting out of bed/chair/stretcher/Non-slip footwear applied when patient is off stretcher/[place] to call system/Physically safe environment - no spills, clutter or unnecessary equipment/Purposeful proactive rounding/Room/bathroom lighting operational, light cord in reach

## 2023-06-08 NOTE — ED ADULT NURSE NOTE - OBJECTIVE STATEMENT
pt axo3, c/o SOB starting today. pt attributes SOB due to the poor air quality today. pt denies chest pain. safety maintained.

## 2023-06-08 NOTE — ED PROVIDER NOTE - CLINICAL SUMMARY MEDICAL DECISION MAKING FREE TEXT BOX
78-year-old female past medical history of asthma lung cancer status post right lower lobectomy 5 years ago presents to the ED complaining of shortness of breath.  At this current time, current events have it that there are [place] in [place], [place], and the wind currents have affected the air quality in [place].  Patient states that she was outdoors twice today.  The first time without a mask.  Patient states she needed to use her nebulizer.  Used her nebulizer at home x1.  Also of note, around May 12, patient came to the ED and was diagnosed with pneumonia.  She followed up with her doctor who has scheduled her for CAT scan of the lungs to be performed on Wednesday approaching.  Patient states she still has an occasional cough since then.  No fevers or chills.  No chest pain.  No calf pain leg swelling or other complaints.  No recent travel.  Patient states that her breathing feels worse when she is laying down to go to sleep.  Consider asthma exacerbation secondary to the poor air quality.  Will give DuoNeb.  Will give prednisone.  Will obtain chest x-ray.  Reassess. 78-year-old female past medical history of asthma lung cancer status post right lower lobectomy 5 years ago presents to the ED complaining of shortness of breath.  At this current time, current events have it that there are [place] in [place], [place], and the wind currents have affected the air quality in [place].  Patient states that she was outdoors twice today.  The first time without a mask.  Patient states she needed to use her nebulizer.  Used her nebulizer at home x1.  Also of note, around May 12, patient came to the ED and was diagnosed with pneumonia.  She followed up with her doctor as previously recommended, who has scheduled her for CAT scan of the lungs to be performed on Wednesday approaching.  Patient states she still has an occasional cough since then.  No fevers or chills.  No chest pain.  No calf pain leg swelling or other complaints.  No recent travel.  Patient states that her breathing feels worse when she is laying down to go to sleep. Consider asthma exacerbation secondary to the poor air quality.  Will give DuoNeb.  Will give prednisone.  Will obtain chest x-ray.  Reassess.     Prelim no infiltrate on xray. pt has h/o lung cancer and will have f/u CT on Wednesday, as recommended. Informed patient to maintain that appointment. O2 sat normal. Pt feeling improved after nebulization. Prelim xray without acute findings. Informed pt we will call if any discrepancy.     Stable for d/c. Worsening, continued or ANY new concerning symptoms return to the emergency department.

## 2023-06-08 NOTE — ED PROVIDER NOTE - OBJECTIVE STATEMENT
78-year-old female past medical history of asthma lung cancer status post right lower lobectomy 5 years ago presents to the ED complaining of shortness of breath.  At this current time, current events have it that there are [place]fires in [place], [place], and the wind currents have affected the air quality in [place].  Patient states that she was outdoors twice today.  The first time without a mask.  Patient states she needed to use her nebulizer.  Used her nebulizer at home x1.  Also of note, around May 12, patient came to the ED and was diagnosed with pneumonia.  She followed up with her doctor who has scheduled her for CAT scan of the lungs to be performed on Wednesday approaching.  Patient states she still has an occasional cough since then.  No fevers or chills.  No chest pain.  No calf pain leg swelling or other complaints.  No recent travel.  Patient states that her breathing feels worse when she is laying down to go to sleep. 78-year-old female past medical history of asthma lung cancer status post right lower lobectomy 5 years ago presents to the ED complaining of shortness of breath.  At this current time, current events have it that there are [place], [place], and the wind currents have affected the air quality in [place].  Patient states that she was outdoors twice today.  The first time without a mask.  Patient states she needed to use her nebulizer.  Used her nebulizer at home x1.  Also of note, around May 12, patient came to the ED and was diagnosed with pneumonia.  She followed up with her doctor as previously recommended, who has scheduled her for CAT scan of the lungs to be performed on Wednesday approaching.  Patient states she still has an occasional cough since then.  No fevers or chills.  No chest pain.  No calf pain leg swelling or other complaints.  No recent travel.  Patient states that her breathing feels worse when she is laying down to go to sleep.

## 2023-06-09 NOTE — ED POST DISCHARGE NOTE - DETAILS
in ed provider note: documentation stating: Prelim no infiltrate on xray. pt has h/o lung cancer and will have f/u CT on Wednesday, as recommended. Informed patient to maintain that appointment.

## 2023-06-11 NOTE — CHART NOTE - NSCHARTNOTEFT_GEN_A_CORE
SW called pt to discuss and assist with follow up care.  Pt is a 79 y/o female presented to ED for breathing difficulty.  Pt did not respond to the call, left VM if further assistance is needed.

## 2023-06-13 NOTE — ED ADULT NURSE NOTE - NS ED NOTE  TALK SOMEONE YN
Initiate Treatment: Tretinoin 0.05% or Kala Ora
Detail Level: Simple
Render In Strict Bullet Format?: No
No

## 2023-08-29 ENCOUNTER — APPOINTMENT (OUTPATIENT)
Dept: ORTHOPEDIC SURGERY | Facility: CLINIC | Age: 78
End: 2023-08-29
Payer: MEDICARE

## 2023-08-29 VITALS — HEIGHT: 64.5 IN | WEIGHT: 137 LBS | BODY MASS INDEX: 23.1 KG/M2

## 2023-08-29 DIAGNOSIS — M19.049 PRIMARY OSTEOARTHRITIS, UNSPECIFIED HAND: ICD-10-CM

## 2023-08-29 PROCEDURE — 73130 X-RAY EXAM OF HAND: CPT | Mod: RT

## 2023-08-29 PROCEDURE — 99213 OFFICE O/P EST LOW 20 MIN: CPT

## 2023-08-29 NOTE — ADDENDUM
[FreeTextEntry1] : I, Real Valencia wrote this note acting as a scribe for Dr. Chon Rolon on Aug 29, 2023

## 2023-08-29 NOTE — PHYSICAL EXAM
[de-identified] : Patient is WDWN, alert, and in no acute distress. Breathing is unlabored. She is grossly oriented to person, place, and time.\par  \par  Right Hand (Index Finger):\par  Tenderness and swelling about the second MCP joint.\par  Full ROM. Pain with extension of the digit. \par  Strength is limited by pain. [de-identified] : AP, lateral and oblique views of the right HAND were obtained today and revealed no abnormalities. No acute fracture. No dislocation. Cartilage spaces are maintained.

## 2023-08-29 NOTE — DISCUSSION/SUMMARY
[FreeTextEntry1] : The underlying pathophysiology was reviewed with the patient. XR films were reviewed with the patient. Discussed at length the nature of the patient's condition. The right hand symptoms appear secondary to contusion versus inflammation.   Treatment options were discussed including; observation, NSAIDs, Tylenol, topical analgesics, injection(s), buddy taping, and surgical intervention. I told the patient that the pain she is experiencing is from tendinitis.   I reassured her there are no fractures. NSAIDs as tolerated and OTC Voltaren gel. We discussed a possible cortisone injection. Patient would like to hold off.   All questions answered, understanding verbalized. Patient in agreement with plan of care. Follow up as needed.

## 2023-08-29 NOTE — END OF VISIT
[FreeTextEntry3] : All medical record entries made by the Scribe were at my, Dr. Chon Rolon MD., direction and personally dictated by me on 08/29/2023. I have personally reviewed the chart and agree that the record accurately reflects my personal performance of the history, physical exam, assessment and plan.

## 2023-08-29 NOTE — HISTORY OF PRESENT ILLNESS
[Right] : right hand dominant [FreeTextEntry1] : Patient is a 77 year old RHD female who presents with complaints of pain and swelling about the MCP joint of the 2nd digit/index finger. She recalls hitting the area at some point but the pain was present before this injury. She notes that her right hand has been progressively getting weaker for quite some time. However, the pain and swelling about the index finger started more recently and has worsened. She notes significant sensitivity to light touch and pain worst with flexion of the finger. She also complains of mild discomfort that radiates towards her wrist. She denies numbness and tingling. Denies locking or triggering about the finger. This pain is interfering with her daily activities. She has been taking Tylenol and Advil.  She was last seen for her right shoulder. She states the shoulder still bothers her but not enough for another injection or formal PT.

## 2023-11-13 NOTE — ED PROVIDER NOTE - PROGRESS NOTE DETAILS
Preceptor Attestation:    I discussed the patient with the resident and evaluated the patient in person. I have verified the content of the note, which accurately reflects my assessment of the patient and the plan of care.   Supervising Physician:  Jame Edwards MD.     APRIL Butcher: Dr. Alanis spoke with patient, there is a risk for PE given recent travel and lung Ca Hx. SDM with patient, will premedicate for CTA r/o PE. pt reports she gets wheezing with the IV contrast

## 2023-12-22 ENCOUNTER — APPOINTMENT (OUTPATIENT)
Dept: MAMMOGRAPHY | Facility: HOSPITAL | Age: 78
End: 2023-12-22

## 2023-12-22 ENCOUNTER — APPOINTMENT (OUTPATIENT)
Dept: ULTRASOUND IMAGING | Facility: HOSPITAL | Age: 78
End: 2023-12-22

## 2024-02-07 ENCOUNTER — OUTPATIENT (OUTPATIENT)
Dept: OUTPATIENT SERVICES | Facility: HOSPITAL | Age: 79
LOS: 1 days | End: 2024-02-07
Payer: MEDICARE

## 2024-02-07 ENCOUNTER — APPOINTMENT (OUTPATIENT)
Dept: MAMMOGRAPHY | Facility: HOSPITAL | Age: 79
End: 2024-02-07
Payer: MEDICARE

## 2024-02-07 ENCOUNTER — APPOINTMENT (OUTPATIENT)
Dept: ULTRASOUND IMAGING | Facility: HOSPITAL | Age: 79
End: 2024-02-07
Payer: MEDICARE

## 2024-02-07 DIAGNOSIS — Z12.31 ENCOUNTER FOR SCREENING MAMMOGRAM FOR MALIGNANT NEOPLASM OF BREAST: ICD-10-CM

## 2024-02-07 DIAGNOSIS — Z98.890 OTHER SPECIFIED POSTPROCEDURAL STATES: Chronic | ICD-10-CM

## 2024-02-07 PROCEDURE — 76641 ULTRASOUND BREAST COMPLETE: CPT | Mod: 26,50,GY

## 2024-02-07 PROCEDURE — 77063 BREAST TOMOSYNTHESIS BI: CPT

## 2024-02-07 PROCEDURE — 77063 BREAST TOMOSYNTHESIS BI: CPT | Mod: 26,59

## 2024-02-07 PROCEDURE — 77065 DX MAMMO INCL CAD UNI: CPT | Mod: 26,GG,RT

## 2024-02-07 PROCEDURE — 77067 SCR MAMMO BI INCL CAD: CPT | Mod: 26,59

## 2024-02-07 PROCEDURE — 76641 ULTRASOUND BREAST COMPLETE: CPT

## 2024-02-07 PROCEDURE — 77065 DX MAMMO INCL CAD UNI: CPT

## 2024-02-07 PROCEDURE — 77067 SCR MAMMO BI INCL CAD: CPT

## 2024-02-20 ENCOUNTER — APPOINTMENT (OUTPATIENT)
Dept: ORTHOPEDIC SURGERY | Facility: CLINIC | Age: 79
End: 2024-02-20
Payer: MEDICARE

## 2024-02-20 PROCEDURE — 20610 DRAIN/INJ JOINT/BURSA W/O US: CPT | Mod: LT

## 2024-02-20 PROCEDURE — 99213 OFFICE O/P EST LOW 20 MIN: CPT | Mod: 25

## 2024-02-20 NOTE — HISTORY OF PRESENT ILLNESS
[de-identified] : Pt is 78-year female with left shoulder pain, worsening over the past several months. She denies any significant injury. She has lost range of motion, most notably with internal rotation. She is unable to sleep on her left side. OTC NSAIDs have not provided relief. She presents today for initial evaluation.

## 2024-02-20 NOTE — DISCUSSION/SUMMARY
[de-identified] : The underlying pathophysiology was reviewed with the patient. XR films were reviewed with the patient. Discussed at length the nature of the patient's condition.   Her symptoms are likely secondary to rotator cuff inflammation.  The patient wishes to proceed with a cortisone injection today. Under sterile precautions, an injection of 5cc 1% lidocaine without epinephrine and 0.5cc Kenalog 40mg, 0.5cc Dexamethasone was administered into the left shoulder. The patient tolerated the procedure well.  Additionally, the patient wishes to proceed with physical therapy. A script was given.  All questions answered, understanding verbalized. Patient in agreement with plan of care. Patient may follow up in 2-3 weeks as needed.

## 2024-02-20 NOTE — PHYSICAL EXAM
[de-identified] : Patient is WDWN, alert, and in no acute distress. Breathing is unlabored. She is grossly oriented to person, place, and time.   Left Shoulder:   Inspection/Palpation : no tenderness, no swelling, no deformities Range of Motion : Active forward elevation: 100 degrees Active external rotation: 30 degrees with pain Active internal rotation: Sacrum with pain Sensation : sensation intact to light touch Skin : no skin rash or discoloration Vascular Exam : no edema, no cyanosis, radial and ulnar pulses normal [de-identified] : AP, transcapula, and axillary views of the left shoulder were obtained demonstrated no obvious fractures or dislocations.

## 2024-02-20 NOTE — END OF VISIT
[FreeTextEntry3] : All medical record entries made by the scribe, were at my, Dr. Chon Rolon MD., direction and personally dictated by me on 02/20/2024. I have personally reviewed the chart and agree that the record accurately reflects my personal performance of the history, physical exam, assessment and plan.

## 2024-03-29 ENCOUNTER — EMERGENCY (EMERGENCY)
Facility: HOSPITAL | Age: 79
LOS: 1 days | Discharge: HOME | End: 2024-03-29
Attending: EMERGENCY MEDICINE | Admitting: EMERGENCY MEDICINE
Payer: MEDICARE

## 2024-03-29 VITALS
WEIGHT: 134.04 LBS | RESPIRATION RATE: 18 BRPM | SYSTOLIC BLOOD PRESSURE: 138 MMHG | TEMPERATURE: 98 F | OXYGEN SATURATION: 96 % | HEART RATE: 96 BPM | HEIGHT: 64 IN | DIASTOLIC BLOOD PRESSURE: 80 MMHG

## 2024-03-29 VITALS
OXYGEN SATURATION: 97 % | RESPIRATION RATE: 16 BRPM | DIASTOLIC BLOOD PRESSURE: 76 MMHG | HEART RATE: 91 BPM | SYSTOLIC BLOOD PRESSURE: 117 MMHG

## 2024-03-29 DIAGNOSIS — Z98.890 OTHER SPECIFIED POSTPROCEDURAL STATES: Chronic | ICD-10-CM

## 2024-03-29 LAB
ALBUMIN SERPL ELPH-MCNC: 3.6 G/DL — SIGNIFICANT CHANGE UP (ref 3.3–5)
ALP SERPL-CCNC: 67 U/L — SIGNIFICANT CHANGE UP (ref 40–120)
ALT FLD-CCNC: 37 U/L — SIGNIFICANT CHANGE UP (ref 10–45)
ANION GAP SERPL CALC-SCNC: 13 MMOL/L — SIGNIFICANT CHANGE UP (ref 5–17)
AST SERPL-CCNC: 25 U/L — SIGNIFICANT CHANGE UP (ref 10–40)
BASOPHILS # BLD AUTO: 0.04 K/UL — SIGNIFICANT CHANGE UP (ref 0–0.2)
BASOPHILS NFR BLD AUTO: 0.4 % — SIGNIFICANT CHANGE UP (ref 0–2)
BILIRUB SERPL-MCNC: 0.5 MG/DL — SIGNIFICANT CHANGE UP (ref 0.2–1.2)
BUN SERPL-MCNC: 21 MG/DL — SIGNIFICANT CHANGE UP (ref 7–23)
CALCIUM SERPL-MCNC: 9 MG/DL — SIGNIFICANT CHANGE UP (ref 8.4–10.5)
CHLORIDE SERPL-SCNC: 102 MMOL/L — SIGNIFICANT CHANGE UP (ref 96–108)
CO2 SERPL-SCNC: 26 MMOL/L — SIGNIFICANT CHANGE UP (ref 22–31)
CREAT SERPL-MCNC: 0.71 MG/DL — SIGNIFICANT CHANGE UP (ref 0.5–1.3)
EGFR: 86 ML/MIN/1.73M2 — SIGNIFICANT CHANGE UP
EOSINOPHIL # BLD AUTO: 0.04 K/UL — SIGNIFICANT CHANGE UP (ref 0–0.5)
EOSINOPHIL NFR BLD AUTO: 0.4 % — SIGNIFICANT CHANGE UP (ref 0–6)
FLUAV AG NPH QL: SIGNIFICANT CHANGE UP
FLUBV AG NPH QL: SIGNIFICANT CHANGE UP
GLUCOSE SERPL-MCNC: 112 MG/DL — HIGH (ref 70–99)
HCT VFR BLD CALC: 42.4 % — SIGNIFICANT CHANGE UP (ref 34.5–45)
HGB BLD-MCNC: 14.1 G/DL — SIGNIFICANT CHANGE UP (ref 11.5–15.5)
IMM GRANULOCYTES NFR BLD AUTO: 0.9 % — SIGNIFICANT CHANGE UP (ref 0–0.9)
LYMPHOCYTES # BLD AUTO: 1.51 K/UL — SIGNIFICANT CHANGE UP (ref 1–3.3)
LYMPHOCYTES # BLD AUTO: 13.5 % — SIGNIFICANT CHANGE UP (ref 13–44)
MCHC RBC-ENTMCNC: 30.9 PG — SIGNIFICANT CHANGE UP (ref 27–34)
MCHC RBC-ENTMCNC: 33.3 GM/DL — SIGNIFICANT CHANGE UP (ref 32–36)
MCV RBC AUTO: 93 FL — SIGNIFICANT CHANGE UP (ref 80–100)
MONOCYTES # BLD AUTO: 0.48 K/UL — SIGNIFICANT CHANGE UP (ref 0–0.9)
MONOCYTES NFR BLD AUTO: 4.3 % — SIGNIFICANT CHANGE UP (ref 2–14)
NEUTROPHILS # BLD AUTO: 9.04 K/UL — HIGH (ref 1.8–7.4)
NEUTROPHILS NFR BLD AUTO: 80.5 % — HIGH (ref 43–77)
NRBC # BLD: 0 /100 WBCS — SIGNIFICANT CHANGE UP (ref 0–0)
PLATELET # BLD AUTO: 276 K/UL — SIGNIFICANT CHANGE UP (ref 150–400)
POTASSIUM SERPL-MCNC: 3.8 MMOL/L — SIGNIFICANT CHANGE UP (ref 3.5–5.3)
POTASSIUM SERPL-SCNC: 3.8 MMOL/L — SIGNIFICANT CHANGE UP (ref 3.5–5.3)
PROT SERPL-MCNC: 6.8 G/DL — SIGNIFICANT CHANGE UP (ref 6–8.3)
RBC # BLD: 4.56 M/UL — SIGNIFICANT CHANGE UP (ref 3.8–5.2)
RBC # FLD: 13.1 % — SIGNIFICANT CHANGE UP (ref 10.3–14.5)
RSV RNA NPH QL NAA+NON-PROBE: DETECTED
SARS-COV-2 RNA SPEC QL NAA+PROBE: SIGNIFICANT CHANGE UP
SODIUM SERPL-SCNC: 141 MMOL/L — SIGNIFICANT CHANGE UP (ref 135–145)
WBC # BLD: 11.21 K/UL — HIGH (ref 3.8–10.5)
WBC # FLD AUTO: 11.21 K/UL — HIGH (ref 3.8–10.5)

## 2024-03-29 PROCEDURE — 93010 ELECTROCARDIOGRAM REPORT: CPT

## 2024-03-29 PROCEDURE — 96374 THER/PROPH/DIAG INJ IV PUSH: CPT

## 2024-03-29 PROCEDURE — 85025 COMPLETE CBC W/AUTO DIFF WBC: CPT

## 2024-03-29 PROCEDURE — 94640 AIRWAY INHALATION TREATMENT: CPT

## 2024-03-29 PROCEDURE — 93005 ELECTROCARDIOGRAM TRACING: CPT

## 2024-03-29 PROCEDURE — 96361 HYDRATE IV INFUSION ADD-ON: CPT

## 2024-03-29 PROCEDURE — 80053 COMPREHEN METABOLIC PANEL: CPT

## 2024-03-29 PROCEDURE — 87637 SARSCOV2&INF A&B&RSV AMP PRB: CPT

## 2024-03-29 PROCEDURE — 99285 EMERGENCY DEPT VISIT HI MDM: CPT

## 2024-03-29 PROCEDURE — 99285 EMERGENCY DEPT VISIT HI MDM: CPT | Mod: 25

## 2024-03-29 PROCEDURE — 71250 CT THORAX DX C-: CPT | Mod: 26,MC

## 2024-03-29 PROCEDURE — 36415 COLL VENOUS BLD VENIPUNCTURE: CPT

## 2024-03-29 PROCEDURE — 71250 CT THORAX DX C-: CPT | Mod: MC

## 2024-03-29 RX ORDER — SODIUM CHLORIDE 9 MG/ML
500 INJECTION INTRAMUSCULAR; INTRAVENOUS; SUBCUTANEOUS ONCE
Refills: 0 | Status: COMPLETED | OUTPATIENT
Start: 2024-03-29 | End: 2024-03-29

## 2024-03-29 RX ORDER — ALBUTEROL 90 UG/1
2 AEROSOL, METERED ORAL
Qty: 1 | Refills: 0
Start: 2024-03-29 | End: 2024-04-04

## 2024-03-29 RX ORDER — IPRATROPIUM/ALBUTEROL SULFATE 18-103MCG
3 AEROSOL WITH ADAPTER (GRAM) INHALATION ONCE
Refills: 0 | Status: COMPLETED | OUTPATIENT
Start: 2024-03-29 | End: 2024-03-29

## 2024-03-29 RX ADMIN — SODIUM CHLORIDE 1000 MILLILITER(S): 9 INJECTION INTRAMUSCULAR; INTRAVENOUS; SUBCUTANEOUS at 13:01

## 2024-03-29 RX ADMIN — SODIUM CHLORIDE 500 MILLILITER(S): 9 INJECTION INTRAMUSCULAR; INTRAVENOUS; SUBCUTANEOUS at 13:48

## 2024-03-29 RX ADMIN — Medication 125 MILLIGRAM(S): at 13:00

## 2024-03-29 RX ADMIN — Medication 3 MILLILITER(S): at 13:48

## 2024-03-29 RX ADMIN — Medication 3 MILLILITER(S): at 13:00

## 2024-03-29 NOTE — ED PROVIDER NOTE - OBJECTIVE STATEMENT
78-year-old female presents to the emergency department reporting shortness of breath and cough.  Patient states she completed Levaquin and steroid, prednisone.  Patient states she does not feel better.  She came to the ED for evaluation.  Patient states she has history of lung cancer.  Her most recent radiation was about 1 year ago.  Patient states that her doctor or oncologist prescribed her Levaquin prior to her leaving for a cruise in case she needed it.  Patient states by the end of the cruise about 2 days before the end of her cruise, she had onset of cough congestion and feeling like she was coming down with a severe head cold.  Patient states that she took the antibiotic when she returned around March 17 or so.  Patient states when she was not getting better then she started the prednisone which she had in the house already.  10 mg.  Patient's last dose was 10 mg of prednisone this morning.  Here in the ED for evaluation.  No prior medical evaluation prior to arrival.  Home COVID test was negative.

## 2024-03-29 NOTE — ED PROVIDER NOTE - PATIENT PORTAL LINK FT
You can access the FollowMyHealth Patient Portal offered by [facility] by registering at the following website: [email or web address]. By joining [employer]’s FollowMyHealth portal, you will also be able to view your health information using other applications (apps) compatible with our system.

## 2024-03-29 NOTE — ED ADULT NURSE NOTE - NSFALLUNIVINTERV_ED_ALL_ED
Bed/Stretcher in lowest position, wheels locked, appropriate side rails in place/Call bell, personal items and telephone in reach/Instruct patient to call for assistance before getting out of bed/chair/stretcher/Non-slip footwear applied when patient is off stretcher/[place] to call system/Physically safe environment - no spills, clutter or unnecessary equipment/Purposeful proactive rounding/Room/bathroom lighting operational, light cord in reach

## 2024-03-29 NOTE — ED ADULT NURSE NOTE - OBJECTIVE STATEMENT
Pt a&ox4 ambulatory to ED c/o SOB. She was on a recent cruise and has been coughing and SOB since, RX ABX and prednisone by PCP with little relief. Pt took a nebulizer this morning with minimal relief. Pt denies CP, denies fevers/chills.
no

## 2024-03-29 NOTE — ED PROVIDER NOTE - PHYSICAL EXAMINATION
General:     NAD, well-nourished, well-appearing  Lungs:     diffuse wheezing/congestion with reactive airway - triggers coughing fits.   CVS:     RRR  Abd:     +BS, s/nt/nd  Ext:   no deformities or edema. no calf tenderness.   Skin: no rash

## 2024-03-29 NOTE — ED PROVIDER NOTE - CARE PLAN
1 Principal Discharge DX:	SOB (shortness of breath)   Principal Discharge DX:	SOB (shortness of breath)  Secondary Diagnosis:	RSV infection

## 2024-03-29 NOTE — ED ADULT TRIAGE NOTE - CHIEF COMPLAINT QUOTE
Patient came from home with complaint of SOB and cough for the past two weeks. Patient was on a cruise about two weeks and still doesn't feel better. Patient was taking prednisone and Levofloxacin. Patient took a nebulizer in the morning. PMH of lung cancer.

## 2024-03-29 NOTE — ED PROVIDER NOTE - CLINICAL SUMMARY MEDICAL DECISION MAKING FREE TEXT BOX
78-year-old female presents to the emergency department reporting shortness of breath and cough.  Patient states she completed Levaquin and steroid, prednisone.  Patient states she does not feel better.  She came to the ED for evaluation.  Patient states she has history of lung cancer.  Her most recent radiation was about 1 year ago.  Patient states that her doctor or oncologist prescribed her Levaquin prior to her leaving for a cruise in case she needed it.  Patient states by the end of the cruise about 2 days before the end of her cruise, she had onset of cough congestion and feeling like she was coming down with a severe head cold.  Patient states that she took the antibiotic when she returned around March 17 or so.  Patient states when she was not getting better then she started the prednisone which she had in the house already.  10 mg.  Patient's last dose was 10 mg of prednisone this morning.  Here in the ED for evaluation.  No prior medical evaluation prior to arrival.  Home COVID test was negative.  Exam as stated. Plan for labs with nebs/steroid. CT chest. (no contrast - pt allergic) 78-year-old female presents to the emergency department reporting shortness of breath and cough.  Patient states she completed Levaquin and steroid, prednisone.  Patient states she does not feel better.  She came to the ED for evaluation.  Patient states she has history of lung cancer.  Her most recent radiation was about 1 year ago.  Patient states that her doctor or oncologist prescribed her Levaquin prior to her leaving for a cruise in case she needed it.  Patient states by the end of the cruise about 2 days before the end of her cruise, she had onset of cough congestion and feeling like she was coming down with a severe head cold.  Patient states that she took the antibiotic when she returned around March 17 or so.  Patient states when she was not getting better then she started the prednisone which she had in the house already.  10 mg.  Patient's last dose was 10 mg of prednisone this morning.  Here in the ED for evaluation.  No prior medical evaluation prior to arrival.  Home COVID test was negative.  Exam as stated. Plan for labs with nebs/steroid. CT chest. (no contrast - pt allergic)    CT results reviewed.  Tree-in-bud opacities noted.  Positive RSV.  Patient oxygenation normal.  Patient states she feels better after steroids and nebs given in the ED.  Stable for discharge.  Will recommend albuterol prednisone 40 mg and Tessalon Perles.

## 2024-04-04 NOTE — CHART NOTE - NSCHARTNOTEFT_GEN_A_CORE
SW called pt to discuss and assist with follow up care .  Pt is 79 y/o female presented to ED for shortness of breadth.  Pt did not respond to the call, left VM for further follow up, if needed.

## 2024-04-10 ENCOUNTER — NON-APPOINTMENT (OUTPATIENT)
Age: 79
End: 2024-04-10

## 2024-06-05 ENCOUNTER — NON-APPOINTMENT (OUTPATIENT)
Age: 79
End: 2024-06-05

## 2024-06-18 ENCOUNTER — APPOINTMENT (OUTPATIENT)
Dept: ORTHOPEDIC SURGERY | Facility: CLINIC | Age: 79
End: 2024-06-18
Payer: MEDICARE

## 2024-06-18 DIAGNOSIS — M25.512 PAIN IN LEFT SHOULDER: ICD-10-CM

## 2024-06-18 PROCEDURE — 99213 OFFICE O/P EST LOW 20 MIN: CPT

## 2024-06-18 NOTE — PHYSICAL EXAM
[de-identified] : Patient is WDWN, alert, and in no acute distress. Breathing is unlabored. She is grossly oriented to person, place, and time.   Left Shoulder:   Inspection/Palpation : no tenderness, no swelling, no deformities Range of Motion : Active forward elevation: 100 degrees Active external rotation: 30 degrees with pain Active internal rotation: Sacrum with pain Sensation : sensation intact to light touch Skin : no skin rash or discoloration Vascular Exam : no edema, no cyanosis, radial and ulnar pulses normal [de-identified] : AP, transcapula, and axillary views of the left shoulder were obtained demonstrated no obvious fractures or dislocations.

## 2024-06-18 NOTE — HISTORY OF PRESENT ILLNESS
[de-identified] : Pt is 78-year female with left shoulder pain, worsening over the past several months. She denies any significant injury. She has lost range of motion, most notably with internal rotation. She is unable to sleep on her left side. OTC NSAIDs have not provided relief. She would like a new physical therapy script.

## 2024-06-18 NOTE — DISCUSSION/SUMMARY
[de-identified] : The underlying pathophysiology was reviewed with the patient. XR films were reviewed with the patient. Discussed at length the nature of the patient's condition.   A new physical therapy script was provided to the patient.  MRI ordered to assess for rotator cuff tear   All questions answered, understanding verbalized. Patient in agreement with plan of care. Patient may follow up after 6 weeks of physical therapy.

## 2024-07-22 ENCOUNTER — EMERGENCY (EMERGENCY)
Facility: HOSPITAL | Age: 79
LOS: 1 days | Discharge: HOME | End: 2024-07-22
Attending: EMERGENCY MEDICINE | Admitting: STUDENT IN AN ORGANIZED HEALTH CARE EDUCATION/TRAINING PROGRAM
Payer: MEDICARE

## 2024-07-22 VITALS
OXYGEN SATURATION: 96 % | DIASTOLIC BLOOD PRESSURE: 77 MMHG | HEART RATE: 73 BPM | SYSTOLIC BLOOD PRESSURE: 147 MMHG | RESPIRATION RATE: 18 BRPM | WEIGHT: 138.01 LBS | HEIGHT: 61 IN | TEMPERATURE: 98 F

## 2024-07-22 DIAGNOSIS — F41.1 GENERALIZED ANXIETY DISORDER: ICD-10-CM

## 2024-07-22 DIAGNOSIS — Z98.890 OTHER SPECIFIED POSTPROCEDURAL STATES: Chronic | ICD-10-CM

## 2024-07-22 LAB
ALBUMIN SERPL ELPH-MCNC: 3.3 G/DL — SIGNIFICANT CHANGE UP (ref 3.3–5)
ALP SERPL-CCNC: 81 U/L — SIGNIFICANT CHANGE UP (ref 40–120)
ALT FLD-CCNC: 34 U/L — SIGNIFICANT CHANGE UP (ref 10–45)
ANION GAP SERPL CALC-SCNC: 4 MMOL/L — LOW (ref 5–17)
AST SERPL-CCNC: 24 U/L — SIGNIFICANT CHANGE UP (ref 10–40)
BASOPHILS # BLD AUTO: 0.04 K/UL — SIGNIFICANT CHANGE UP (ref 0–0.2)
BASOPHILS NFR BLD AUTO: 0.5 % — SIGNIFICANT CHANGE UP (ref 0–2)
BILIRUB SERPL-MCNC: 0.2 MG/DL — SIGNIFICANT CHANGE UP (ref 0.2–1.2)
BUN SERPL-MCNC: 17 MG/DL — SIGNIFICANT CHANGE UP (ref 7–23)
CALCIUM SERPL-MCNC: 8.8 MG/DL — SIGNIFICANT CHANGE UP (ref 8.4–10.5)
CHLORIDE SERPL-SCNC: 105 MMOL/L — SIGNIFICANT CHANGE UP (ref 96–108)
CO2 SERPL-SCNC: 30 MMOL/L — SIGNIFICANT CHANGE UP (ref 22–31)
CREAT SERPL-MCNC: 0.92 MG/DL — SIGNIFICANT CHANGE UP (ref 0.5–1.3)
EGFR: 63 ML/MIN/1.73M2 — SIGNIFICANT CHANGE UP
EOSINOPHIL # BLD AUTO: 0.14 K/UL — SIGNIFICANT CHANGE UP (ref 0–0.5)
EOSINOPHIL NFR BLD AUTO: 1.7 % — SIGNIFICANT CHANGE UP (ref 0–6)
GLUCOSE SERPL-MCNC: 131 MG/DL — HIGH (ref 70–99)
HCT VFR BLD CALC: 37.9 % — SIGNIFICANT CHANGE UP (ref 34.5–45)
HGB BLD-MCNC: 12.6 G/DL — SIGNIFICANT CHANGE UP (ref 11.5–15.5)
IMM GRANULOCYTES NFR BLD AUTO: 0.4 % — SIGNIFICANT CHANGE UP (ref 0–0.9)
LYMPHOCYTES # BLD AUTO: 1.41 K/UL — SIGNIFICANT CHANGE UP (ref 1–3.3)
LYMPHOCYTES # BLD AUTO: 17.4 % — SIGNIFICANT CHANGE UP (ref 13–44)
MCHC RBC-ENTMCNC: 30.7 PG — SIGNIFICANT CHANGE UP (ref 27–34)
MCHC RBC-ENTMCNC: 33.2 GM/DL — SIGNIFICANT CHANGE UP (ref 32–36)
MCV RBC AUTO: 92.2 FL — SIGNIFICANT CHANGE UP (ref 80–100)
MONOCYTES # BLD AUTO: 0.45 K/UL — SIGNIFICANT CHANGE UP (ref 0–0.9)
MONOCYTES NFR BLD AUTO: 5.5 % — SIGNIFICANT CHANGE UP (ref 2–14)
NEUTROPHILS # BLD AUTO: 6.04 K/UL — SIGNIFICANT CHANGE UP (ref 1.8–7.4)
NEUTROPHILS NFR BLD AUTO: 74.5 % — SIGNIFICANT CHANGE UP (ref 43–77)
NRBC # BLD: 0 /100 WBCS — SIGNIFICANT CHANGE UP (ref 0–0)
PLATELET # BLD AUTO: 212 K/UL — SIGNIFICANT CHANGE UP (ref 150–400)
POTASSIUM SERPL-MCNC: 4.3 MMOL/L — SIGNIFICANT CHANGE UP (ref 3.5–5.3)
POTASSIUM SERPL-SCNC: 4.3 MMOL/L — SIGNIFICANT CHANGE UP (ref 3.5–5.3)
PROT SERPL-MCNC: 6.3 G/DL — SIGNIFICANT CHANGE UP (ref 6–8.3)
RBC # BLD: 4.11 M/UL — SIGNIFICANT CHANGE UP (ref 3.8–5.2)
RBC # FLD: 12 % — SIGNIFICANT CHANGE UP (ref 10.3–14.5)
SODIUM SERPL-SCNC: 139 MMOL/L — SIGNIFICANT CHANGE UP (ref 135–145)
TROPONIN I, HIGH SENSITIVITY RESULT: 4.4 NG/L — SIGNIFICANT CHANGE UP
WBC # BLD: 8.11 K/UL — SIGNIFICANT CHANGE UP (ref 3.8–10.5)
WBC # FLD AUTO: 8.11 K/UL — SIGNIFICANT CHANGE UP (ref 3.8–10.5)

## 2024-07-22 PROCEDURE — 90792 PSYCH DIAG EVAL W/MED SRVCS: CPT

## 2024-07-22 PROCEDURE — 73501 X-RAY EXAM HIP UNI 1 VIEW: CPT | Mod: 26,RT

## 2024-07-22 PROCEDURE — 99285 EMERGENCY DEPT VISIT HI MDM: CPT

## 2024-07-22 PROCEDURE — 93010 ELECTROCARDIOGRAM REPORT: CPT

## 2024-07-22 PROCEDURE — 70450 CT HEAD/BRAIN W/O DYE: CPT | Mod: 26,MC

## 2024-07-22 PROCEDURE — 99223 1ST HOSP IP/OBS HIGH 75: CPT

## 2024-07-22 RX ORDER — BUDESONIDE/FORMOTEROL FUMARATE 160-4.5MCG
2 HFA AEROSOL WITH ADAPTER (GRAM) INHALATION
Refills: 0 | Status: ACTIVE | OUTPATIENT
Start: 2024-07-22 | End: 2025-06-20

## 2024-07-22 RX ORDER — ESCITALOPRAM OXALATE 20 MG/1
10 TABLET, FILM COATED ORAL DAILY
Refills: 0 | Status: ACTIVE | OUTPATIENT
Start: 2024-07-23 | End: 2025-06-21

## 2024-07-22 RX ORDER — ACETAMINOPHEN 325 MG
650 TABLET ORAL EVERY 6 HOURS
Refills: 0 | Status: ACTIVE | OUTPATIENT
Start: 2024-07-22 | End: 2025-06-20

## 2024-07-22 RX ORDER — DEXTROSE MONOHYDRATE AND SODIUM CHLORIDE 5; .3 G/100ML; G/100ML
1000 INJECTION, SOLUTION INTRAVENOUS ONCE
Refills: 0 | Status: COMPLETED | OUTPATIENT
Start: 2024-07-22 | End: 2024-07-22

## 2024-07-22 RX ORDER — SODIUM CHLORIDE 0.9 % (FLUSH) 0.9 %
1000 SYRINGE (ML) INJECTION ONCE
Refills: 0 | Status: COMPLETED | OUTPATIENT
Start: 2024-07-22 | End: 2024-07-22

## 2024-07-22 RX ORDER — ALBUTEROL 90 MCG
2 AEROSOL REFILL (GRAM) INHALATION EVERY 6 HOURS
Refills: 0 | Status: ACTIVE | OUTPATIENT
Start: 2024-07-22 | End: 2025-06-20

## 2024-07-22 RX ORDER — ASPIRIN 325 MG/1
81 TABLET, FILM COATED ORAL DAILY
Refills: 0 | Status: DISCONTINUED | OUTPATIENT
Start: 2024-07-22 | End: 2024-07-22

## 2024-07-22 RX ORDER — ENOXAPARIN SODIUM 100 MG/ML
40 INJECTION SUBCUTANEOUS EVERY 24 HOURS
Refills: 0 | Status: ACTIVE | OUTPATIENT
Start: 2024-07-22 | End: 2025-06-20

## 2024-07-22 RX ORDER — MAGNESIUM, ALUMINUM HYDROXIDE 400-400
30 TABLET,CHEWABLE ORAL EVERY 4 HOURS
Refills: 0 | Status: ACTIVE | OUTPATIENT
Start: 2024-07-22 | End: 2025-06-20

## 2024-07-22 RX ORDER — ONDANSETRON HYDROCHLORIDE 2 MG/ML
4 INJECTION INTRAMUSCULAR; INTRAVENOUS EVERY 8 HOURS
Refills: 0 | Status: ACTIVE | OUTPATIENT
Start: 2024-07-22 | End: 2025-06-20

## 2024-07-22 RX ADMIN — DEXTROSE MONOHYDRATE AND SODIUM CHLORIDE 1000 MILLILITER(S): 5; .3 INJECTION, SOLUTION INTRAVENOUS at 10:35

## 2024-07-22 RX ADMIN — ENOXAPARIN SODIUM 40 MILLIGRAM(S): 100 INJECTION SUBCUTANEOUS at 18:02

## 2024-07-22 RX ADMIN — Medication 2 PUFF(S): at 22:03

## 2024-07-22 RX ADMIN — Medication 1000 MILLILITER(S): at 06:01

## 2024-07-22 RX ADMIN — Medication 1000 MILLILITER(S): at 08:33

## 2024-07-22 RX ADMIN — DEXTROSE MONOHYDRATE AND SODIUM CHLORIDE 1000 MILLILITER(S): 5; .3 INJECTION, SOLUTION INTRAVENOUS at 11:51

## 2024-07-22 NOTE — ED PROVIDER NOTE - CARE PLAN
1 Principal Discharge DX:	Syncope   Principal Discharge DX:	Syncope  Secondary Diagnosis:	Ambulatory dysfunction

## 2024-07-22 NOTE — ED PROVIDER NOTE - OBJECTIVE STATEMENT
Patient states that around 4 PM last night she had some Valium, followed by a bunch of pot brownies which she states made her feel unwell.  Patient states she got dizzy spells and jerking movements of the arms and then thinks that she passed out because her  found her on the floor.  It was hard for her to get up because of the dizziness.  Patient states she currently feels much better, denies any headache, head trauma.  Reports some mild right hip pain.  Patient states she took the Valium and the brownies because she got a diagnosis of a recurrence of her lung cancer and was upset.  Patient denies SI/HI.

## 2024-07-22 NOTE — BH CONSULTATION LIAISON ASSESSMENT NOTE - SUMMARY
Patient is a 78 yo F with history of Lung ca s/p RLL lobectomy and radiation therapy,  HLD presents to ED for being found down on the floor. Pt states that she had a CT chest recently showed likely recurrence of the lung cancer and pt felt anxious about the news. Pt states that she had Valium at 4 pm, then had some alcohol, followed by a few marijuana brownies. Pt then felt very unwell, felt dizzy and thinks she passed out as her  found her on the floor. States that she did not intent to hurt herself,  she ingested too much marijuana brownies unintentionally. Psychiatry consulted for overdose and depression screening.    Patient, with a history of anxiety and a recent distressing CT scan showing possible cancer recurrence, has been increasingly consuming alcohol and marijuana, leading to an unintentional overdose when she paired it was her prescribed valium. She regrets her actions, which she attributes to impulsivity, and denies any suicidal ideation.

## 2024-07-22 NOTE — H&P ADULT - HISTORY OF PRESENT ILLNESS
80 yo F with history of Lung ca s/p RLL lobectomy and radiation therapy,  CELE presents to ED for being found down on the floor. Pt states that she had a CT chest recently showed likely recurrence of the lung cancer and pt felt anxious about the news. Pt states that she had Valium at 4 pm, then had some alcohol, followed by a few marijuana brownies. Pt then felt very unwell, felt dizzy and thinks she passed out as her  found her on the floor. Pt admits to mild right hip pain. Pt denies suicidal ideation or HI. states that she did not intent to hurt herself,  she ingested too much marijuana brownies unintentionally. no fever, chills, headache, sob, cp, abd pain, nausea, vomiting.  Patient states she currently feels much better, however, still have difficulty getting up due to dizziness.

## 2024-07-22 NOTE — BH CONSULTATION LIAISON ASSESSMENT NOTE - NSBHCONSULTRECOMMENDOTHER_PSY_A_CORE FT
- Based on the patient's account, the overdose was unintentional, and the patient expresses significant regret for her actions. It appears that she impulsively consumed alcohol, marijuana, and her prescribed Valium in response to the distressing news of a possible recurrence of cancer, which was revealed by a recent CT scan.   - Discussed safe use of benzodiazepines with the patient, emphasizing the potential risks and interactions associated with concurrent use of prescribed benzodiazepines and other substances. Patient was provided with detailed psychopharmacological education, which included information on the proper dosage and administration of her prescribed Valium, the dangers of combining it with alcohol and marijuana, and the importance of adhering strictly to the prescribed regimen. Patient was informed about the potential for increased dizziness, impaired judgment, and the risk of overdose when mixing these substances.   - Continue with Lexapro 10mg daily.  - Patient reports she is not interested in outpatient psychiatric treatment or dual diagnosis program at this time,.

## 2024-07-22 NOTE — BH CONSULTATION LIAISON ASSESSMENT NOTE - NSBHCHARTREVIEWINVESTIGATE_PSY_A_CORE FT
< from: 12 Lead ECG (07.22.24 @ 05:43) >      Ventricular Rate 71 BPM    Atrial Rate 71 BPM    P-R Interval 174 ms    QRS Duration 102 ms    Q-T Interval 386 ms    QTC Calculation(Bazett) 419 ms    P Axis 51 degrees    R Axis -16 degrees    T Axis 41 degrees    Diagnosis Line Normal sinus rhythm  When compared with ECG of 29-MAR-2024 12:24,  No significant change was found  Confirmed by Dm Santiago ([id number]) on 7/22/2024 10:08:40 AM    < end of copied text >        < from: CT Head No Cont (07.22.24 @ 07:03) >      ACC: [id number] EXAM:  CT BRAIN   ORDERED BY: AL CORTEZ     PROCEDURE DATE:  07/22/2024          INTERPRETATION:  INDICATION:  Dizziness  TECHNIQUE:  A non contrast thin section axial CT study of the brain was   performed from skull base to vertex. Coronal and sagittal reformations   were generated from the axial data.  COMPARISON EXAMINATION:  CT 12/19/2022    FINDINGS:    HEMISPHERES:  No mass or space occupying lesion.  No acute ischemic   changes or hemorrhagic foci are suggested.  VENTRICLES:  Midline and normal in size.  POSTERIOR FOSSA:  The brain stem and cerebellum are unremarkable.  No CP   angle lesion noted.  EXTRACEREBRAL SPACES:  No subdural or epidural collections are noted.  SKULL BASE AND CALVARIUM:  Appears intact.  No fracture or destructive   lesion is identified.  SINUSES AND MASTOIDS:  Clear.  MISCELLANEOUS:  No orbital or suprasellar abnormality noted.    IMPRESSION:    1)  unremarkable CT study of the brain  2)  clear sinuses and mastoids..    --- End of Report ---            RADHA BECERRA MD; Attending Radiologist  This document has been electronically signed. Jul 22 2024  8:12AM    < end of copied text >

## 2024-07-22 NOTE — PATIENT PROFILE ADULT - CENTRAL VENOUS CATHETER/PICC LINE
You can access the FollowMyHealth Patient Portal offered by [facility] by registering at the following website: [email or web address]. By joining [facility]’s FollowMyHealth portal, you will also be able to view your health information using other applications (apps) compatible with our system. no

## 2024-07-22 NOTE — H&P ADULT - NSHPPHYSICALEXAM_GEN_ALL_CORE
T(C): 36.5 (07-22-24 @ 05:29), Max: 36.5 (07-22-24 @ 05:29)  HR: 73 (07-22-24 @ 05:29) (73 - 73)  BP: 147/77 (07-22-24 @ 05:29) (147/77 - 147/77)  RR: 18 (07-22-24 @ 05:29) (18 - 18)  SpO2: 96% (07-22-24 @ 05:29) (96% - 96%)  Wt(kg): --Vital Signs Last 24 Hrs  T(C): 36.5 (22 Jul 2024 05:29), Max: 36.5 (22 Jul 2024 05:29)  T(F): 97.7 (22 Jul 2024 05:29), Max: 97.7 (22 Jul 2024 05:29)  HR: 73 (22 Jul 2024 05:29) (73 - 73)  BP: 147/77 (22 Jul 2024 05:29) (147/77 - 147/77)  BP(mean): --  RR: 18 (22 Jul 2024 05:29) (18 - 18)  SpO2: 96% (22 Jul 2024 05:29) (96% - 96%)    Parameters below as of 22 Jul 2024 05:29  Patient On (Oxygen Delivery Method): room air        PHYSICAL EXAM:  GENERAL: NAD, awake, alert, answering questions   HEAD:  Atraumatic, Normocephalic  EYES: EOMI, PERRLA, conjunctiva and sclera clear  ENMT: No tonsillar erythema, exudates, or enlargement; Moist mucous membranes,   NERVOUS SYSTEM:  Alert & Oriented X3, Good concentration; moving all extremities   CHEST/LUNG: Clear to percussion bilaterally; No rales, rhonchi, wheezing, or rubs  HEART: Regular rate and rhythm; No murmurs, rubs, or gallops  ABDOMEN: Soft, Nontender, Nondistended; Bowel sounds present  EXTREMITIES:  No clubbing, cyanosis, or edema

## 2024-07-22 NOTE — BH CONSULTATION LIAISON ASSESSMENT NOTE - RISK ASSESSMENT
Risk factors include substance misuse, recent cancer diagnosis, hx of mood disorder, ongoing anxiety.     Protective factors include no history of suicidality, no current suicidality, no history of aggression/combativeness, strong support systems, outpatient therapist.

## 2024-07-22 NOTE — PATIENT PROFILE ADULT - FALL HARM RISK - RISK INTERVENTIONS

## 2024-07-22 NOTE — ED PROVIDER NOTE - WR ORDER DATE AND TIME 1
[facility]    PATIENT'S NAME: LEAH AYOUB MORIAH   ATTENDING PHYSICIAN: Daniela Flores MD   PATIENT ACCOUNT#:   [id number]    LOCATION:    MEDICAL RECORD #:   [id number]       YOB: 1963  ADMISSION DATE:       11/09/2018    HISTORY Drinks alcohol 4 to 5 drinks per week. She works in customer service. Has no history of drug use. FAMILY HISTORY:  Significant for a family history of breast cancer in her mother, colon cancer in her father.   She does do colonoscopies every 5 years a risk of anesthesia. The patient understands the risks and verbalizes consent to proceed with her surgery on November 9. Dictated By Kiana Ariza MD  d: 11/08/2018 21:41:34  t: 11/08/2018 21:50:34  Job [id number]  [facility]/ 22-Jul-2024 05:41

## 2024-07-22 NOTE — BH CONSULTATION LIAISON ASSESSMENT NOTE - ADDITIONAL PSYCHIATRIC MEDICATIONS
PDI	My Rx	Current Rx	Drug Type	Rx Written	Rx Dispensed	Drug	Quantity	Days Supply	Prescriber Name	Prescriber TEODORO #	Payment Method	Dispenser  A	N	N	B	02/22/2024	02/23/2024	alprazolam 0.25 mg tablet	60	30	Con Looney	[id number]	Cash	[facility]  A	N	N	B	10/10/2023	10/13/2023	alprazolam 0.25 mg tablet	60	30	Con Looney	[id number]	Cash	[facility]

## 2024-07-22 NOTE — ED PROVIDER NOTE - CLINICAL SUMMARY MEDICAL DECISION MAKING FREE TEXT BOX
pt s/p syncopal episode after valium and marijuana, ED work up in progress at time of signout. pt s/p syncopal episode after valium and marijuana, ED work up in progress at time of signout.  Patient reevaluated.  Continues to feel dizzy tolerating p.o. well  CT brain is negative pt s/p syncopal episode after valium and marijuana, ED work up in progress at time of signout.  Patient reevaluated.  Continues to feel dizzy tolerating p.o. well  CT brain is negative  11:45 AM patient unable to ambulate even with a walker plan to admit the patient for observation all symptoms are likely due to marijuana overdose

## 2024-07-22 NOTE — ED ADULT NURSE REASSESSMENT NOTE - COMFORT CARE
plan of care explained/wait time explained/warm blanket provided
assisted to bedpan/meal provided/plan of care explained/warm blanket provided
plan of care explained

## 2024-07-22 NOTE — BH CONSULTATION LIAISON ASSESSMENT NOTE - NSBHCONSULTFOLLOWAFTERCARE_PSY_A_CORE FT
Patient reports she is NOT interested in outpatient psychiatry or dual diagnosis program.   Reports she will be following up with her therapist this week.

## 2024-07-22 NOTE — BH CONSULTATION LIAISON ASSESSMENT NOTE - NSBHCONSULTSUBST_PSY_A_CORE
[facility] [phone] is calling for status of medication refill request. Pt is out of medication. no

## 2024-07-22 NOTE — BH CONSULTATION LIAISON ASSESSMENT NOTE - SOURCE OF INFORMATION
Spoke to pt  Informed him I scheduled him and his mother for COVID infusion at [facility] 3/20/2021 at 8:30 and provided him with all instructions on tipsheet 
Patient

## 2024-07-22 NOTE — BH CONSULTATION LIAISON ASSESSMENT NOTE - OTHER PAST PSYCHIATRIC HISTORY (INCLUDE DETAILS REGARDING ONSET, COURSE OF ILLNESS, INPATIENT/OUTPATIENT TREATMENT)
Patient reports hx of ALANNAH in the past, evaluated by a psychiatrist when she was 21. No current outpatient psychiatrist. Patient sees a therapist, Minal Haq PhD

## 2024-07-22 NOTE — BH CONSULTATION LIAISON ASSESSMENT NOTE - CURRENT MEDICATION
MEDICATIONS  (STANDING):  budesonide  80 MICROgram(s)/formoterol 4.5 MICROgram(s) Inhaler 2 Puff(s) Inhalation two times a day  enoxaparin Injectable 40 milliGRAM(s) SubCutaneous every 24 hours    MEDICATIONS  (PRN):  acetaminophen     Tablet .. 650 milliGRAM(s) Oral every 6 hours PRN Temp greater or equal to 38C (100.4F), Mild Pain (1 - 3)  albuterol    90 MICROgram(s) HFA Inhaler 2 Puff(s) Inhalation every 6 hours PRN Shortness of Breath and/or Wheezing  aluminum hydroxide/magnesium hydroxide/simethicone Suspension 30 milliLiter(s) Oral every 4 hours PRN Dyspepsia  melatonin 3 milliGRAM(s) Oral at bedtime PRN Insomnia  ondansetron Injectable 4 milliGRAM(s) IV Push every 8 hours PRN Nausea and/or Vomiting

## 2024-07-22 NOTE — H&P ADULT - ASSESSMENT
78 yo F with history of Lung ca s/p RLL lobectomy and radiation therapy,  HLD presents to ED for being found down on the floor. Pt admitted for persistent dizziness likely due to marijuana overdose.    Dizziness likely due to marijuana overdose  History of lung cancer s/p RLL lobectomy and RT  - Pt states that she had a recent CT chest that showed likely recurrence of the lung cancer and pt felt anxious about the news. states that she had valium, alcohol and then ingested a few marijuana brownies.  - pt denies any SI/HI. states that she did not intent to harm herself but made the mistake of eating too much marijuana brownies  - CT head unremarkable  - Right hip xray negative  - Psych consult     Asthma  - stable  - cont Advair      DVT ppx: lovenox subq 78 yo F with history of Lung ca s/p RLL lobectomy and radiation therapy,  HLD presents to ED for being found down on the floor. Pt admitted for persistent dizziness likely due to marijuana overdose.    Dizziness likely due to marijuana overdose  History of lung cancer s/p RLL lobectomy and RT  - Pt states that she had a recent CT chest that showed likely recurrence of the lung cancer and pt felt anxious about the news. states that she had valium, alcohol and then ingested a few marijuana brownies.  - pt denies any SI/HI. states that she did not intent to harm herself but made the mistake of eating too much marijuana brownies  - CT head unremarkable  - Right hip xray negative  - Psych consult   - unable to log into [facility] to istop the valium prescription due to system issues.     Asthma  - stable  - cont Symbicort substituting Advair      DVT ppx: lovenox subq 80 yo F with history of Lung ca s/p RLL lobectomy and radiation therapy,  HLD presents to ED for being found down on the floor. Pt admitted for persistent dizziness likely due to marijuana overdose.    Dizziness likely due to marijuana overdose  History of lung cancer s/p RLL lobectomy and RT  - Pt states that she had a recent CT chest that showed likely recurrence of the lung cancer and pt felt anxious about the news. states that she had valium, alcohol and then ingested a few marijuana brownies.  - pt denies any SI/HI. states that she did not intent to harm herself but made the mistake of eating too much marijuana brownies  - CT head unremarkable  - Right hip xray negative  - Psych consult   - cont Lexapro   - unable to log into [facility] to istop the valium prescription due to system issues.     Asthma  - stable  - cont Symbicort substituting Advair      DVT ppx: lovenox subq

## 2024-07-22 NOTE — PATIENT PROFILE ADULT - TRANSPORTATION
The patient is Stable - Low risk of patient condition declining or worsening    Shift Goals  Clinical Goals: pain management, N/V control, ambulation  Patient Goals: pain management, N/V control, rest, eat  Family Goals: remain updated on POC      Problem: Pain - Standard  Goal: Alleviation of pain or a reduction in pain to the patient’s comfort goal  Outcome: Progressing  Note: Educated patient on the use of 0-10 pain scale and use of pain descriptors. Administered pain medication when needed per MAR. Non-pharmacological for pain control such as rest, repositioning, and enforcing a calm and conductive environment.      Problem: Fall Risk  Goal: Patient will remain free from falls  Outcome: Progressing  Note: Bed in lowest and locked position, call light is within reach, bed alarm is on, treaded socks in place. Patient oriented to room, POC and educated to use call light for assistance. Patient educated to not get out of bed without staff present.      Problem: Knowledge Deficit - Standard  Goal: Patient and family/care givers will demonstrate understanding of plan of care, disease process/condition, diagnostic tests and medications  Outcome: Progressing  Note: Patient educated on plan and goals of care and disease process. Education provided on medications, procedures, and equipment. Will continue to re-enforce when required. All questions and concerns answered at this time.           no

## 2024-07-22 NOTE — BH CONSULTATION LIAISON ASSESSMENT NOTE - NSBHCHARTREVIEWLAB_PSY_A_CORE FT
Complete Blood Count + Automated Diff (07.22.24 @ 05:54)   WBC Count: 8.11 K/uL  RBC Count: 4.11 M/uL  Hemoglobin: 12.6 g/dL  Hematocrit: 37.9 %  Mean Cell Volume: 92.2 fl  Mean Cell Hemoglobin: 30.7 pg  Mean Cell Hemoglobin Conc: 33.2 gm/dL  Red Cell Distrib Width: 12.0 %  Platelet Count - Automated: 212 K/uL  Auto Neutrophil #: 6.04 K/uL  Auto Lymphocyte #: 1.41 K/uL  Auto Monocyte #: 0.45 K/uL  Auto Eosinophil #: 0.14 K/uL  Auto Basophil #: 0.04 K/uL  Auto Neutrophil %: 74.5: Differential percentages must be correlated with absolute numbers for   clinical significance. %  Auto Lymphocyte %: 17.4 %  Auto Monocyte %: 5.5 %  Auto Eosinophil %: 1.7 %  Auto Basophil %: 0.5 %  Auto Immature Granulocyte %: 0.4: (Includes meta, myelo and promyelocytes). Mild elevations in immature   granulocytes may be seen with many inflammatory processes and pregnancy;   clinical correlation suggested. %  Nucleated RBC: 0 /100 WBCs    Comprehensive Metabolic Panel (07.22.24 @ 05:54)   Sodium: 139 mmol/L  Potassium: 4.3 mmol/L  Chloride: 105 mmol/L  Carbon Dioxide: 30 mmol/L  Anion Gap: 4 mmol/L  Blood Urea Nitrogen: 17 mg/dL  Creatinine: 0.92 mg/dL  Glucose: 131 mg/dL  Calcium: 8.8 mg/dL  Protein Total: 6.3 g/dL  Albumin: 3.3 g/dL  Bilirubin Total: 0.2 mg/dL  Alkaline Phosphatase: 81 U/L  Aspartate Aminotransferase (AST/SGOT): 24 U/L  Alanine Aminotransferase (ALT/SGPT): 34 U/L  eGFR: 63:

## 2024-07-22 NOTE — BH CONSULTATION LIAISON ASSESSMENT NOTE - ADDITIONAL DETAILS ALL
Patient denies any history of suicidality. Reports this incident was unintentional and was not trying to take her life.

## 2024-07-22 NOTE — BH CONSULTATION LIAISON ASSESSMENT NOTE - NSBHSAALC_PSY_A_CORE FT
Reports socially drinking throughout her life. Over the last week, has been increasing her alcohol consumption to a few cosmopolitans (1-3) every other day.

## 2024-07-22 NOTE — ED ADULT NURSE REASSESSMENT NOTE - CONDITION
Patient awaiting bed, attempted to give report nurse Jacquelyn to call back. Safety maintained./improved
Received patient from nurse Elena Cantu. Patient sleeping, awaiting dispo. Environment safe./improved
Patient complaints of dizziness when sitting up. Dr. Ryan made aware./improved

## 2024-07-22 NOTE — ED ADULT TRIAGE NOTE - CHIEF COMPLAINT QUOTE
pt BIB EMS fro home s/p fall. pt reports stress of recent lung CA diagnosis. yesterday pt reports taking valium, drinking 3 glasses of wine, and taking 7 marijuana edibles. pt reports losing consciousness and woke up on floor. denies blood thinners, unknown if head strike occurred. pt reports feeling sick and extremely dizzy since

## 2024-07-22 NOTE — BH CONSULTATION LIAISON ASSESSMENT NOTE - NSBHCHARTREVIEWVS_PSY_A_CORE FT
Vital Signs Last 24 Hrs  T(C): 36.5 (22 Jul 2024 05:29), Max: 36.5 (22 Jul 2024 05:29)  T(F): 97.7 (22 Jul 2024 05:29), Max: 97.7 (22 Jul 2024 05:29)  HR: 73 (22 Jul 2024 05:29) (73 - 73)  BP: 147/77 (22 Jul 2024 05:29) (147/77 - 147/77)  BP(mean): --  RR: 18 (22 Jul 2024 05:29) (18 - 18)  SpO2: 96% (22 Jul 2024 05:29) (96% - 96%)    Parameters below as of 22 Jul 2024 05:29  Patient On (Oxygen Delivery Method): room air

## 2024-07-22 NOTE — H&P ADULT - NSHPLABSRESULTS_GEN_ALL_CORE
LABS:                        12.6   8.11  )-----------( 212      ( 22 Jul 2024 05:54 )             37.9     07-22    139  |  105  |  17  ----------------------------<  131<H>  4.3   |  30  |  0.92    Ca    8.8      22 Jul 2024 05:54    TPro  6.3  /  Alb  3.3  /  TBili  0.2  /  DBili  x   /  AST  24  /  ALT  34  /  AlkPhos  81  07-22      Urinalysis Basic - ( 22 Jul 2024 05:54 )    Color: x / Appearance: x / SG: x / pH: x  Gluc: 131 mg/dL / Ketone: x  / Bili: x / Urobili: x   Blood: x / Protein: x / Nitrite: x   Leuk Esterase: x / RBC: x / WBC x   Sq Epi: x / Non Sq Epi: x / Bacteria: x       CAPILLARY BLOOD GLUCOSE            Urinalysis Basic - ( 22 Jul 2024 05:54 )    Color: x / Appearance: x / SG: x / pH: x  Gluc: 131 mg/dL / Ketone: x  / Bili: x / Urobili: x   Blood: x / Protein: x / Nitrite: x   Leuk Esterase: x / RBC: x / WBC x   Sq Epi: x / Non Sq Epi: x / Bacteria: x        RADIOLOGY & ADDITIONAL TESTS:    Consultant(s) Notes Reviewed:  [x ] YES  [ ] NO  Care Discussed with Consultants/Other Providers [ x] YES  [ ] NO  Imaging Personally Reviewed:  [ ] YES  [ ] NO

## 2024-07-22 NOTE — BH CONSULTATION LIAISON ASSESSMENT NOTE - HPI (INCLUDE ILLNESS QUALITY, SEVERITY, DURATION, TIMING, CONTEXT, MODIFYING FACTORS, ASSOCIATED SIGNS AND SYMPTOMS)
No Patient is a 80 yo F with history of Lung ca s/p RLL lobectomy and radiation therapy,  HLD presents to ED for being found down on the floor. Pt states that she had a CT chest recently showed likely recurrence of the lung cancer and pt felt anxious about the news. Pt states that she had Valium at 4 pm, then had some alcohol, followed by a few marijuana brownies. Pt then felt very unwell, felt dizzy and thinks she passed out as her  found her on the floor. Pt admits to mild right hip pain. Pt denies suicidal ideation or HI. states that she did not intent to hurt herself,  she ingested too much marijuana brownies unintentionally. Psychiatry consulted for overdose and depression screening.    C/L Psychiatry Note:  Chart reviewed and patient evaluated. Patient presents AAOx4, alert, and eating lunch.  She reports a lifelong struggle with anxiety and experienced a low mood during her period of cancer before it went into remission. Over the past year, the patient states that her mood has been "very good," indicating she has been doing well both physically and mentally. Recently, however, a CT scan revealed a possible recurrence of cancer in the upper left and upper right lungs, which has caused the patient significant distress and anxiety. She denies any feelings of depression. In response to this news, she has been increasingly consuming alcohol and using marijuana. Over the last week, she reports drinking "a few" cosmopolitans every other day and escalating her use of marijuana, particularly in the form of edibles. Patient admits to drinking while taking her prescribed Valium and consuming marijuana brownies, which led to increased dizziness and her current clinical presentation. She expresses regret for her actions, stating that this incident was an unintentional overdose due to impulsivity and that nothing like this has happened before. Unable to elicit any delusions or paranoia; denies any hallucinations. Patient denies any suicidal ideation, intent, or plan. Reports having a follow up appointment with her therapist this week and has an appointment with her oncologist tomorrow.

## 2024-07-23 ENCOUNTER — TRANSCRIPTION ENCOUNTER (OUTPATIENT)
Age: 79
End: 2024-07-23

## 2024-07-23 LAB
ANION GAP SERPL CALC-SCNC: 7 MMOL/L — SIGNIFICANT CHANGE UP (ref 5–17)
BASOPHILS # BLD AUTO: 0.03 K/UL — SIGNIFICANT CHANGE UP (ref 0–0.2)
BASOPHILS NFR BLD AUTO: 0.5 % — SIGNIFICANT CHANGE UP (ref 0–2)
BUN SERPL-MCNC: 11 MG/DL — SIGNIFICANT CHANGE UP (ref 7–23)
CALCIUM SERPL-MCNC: 8.5 MG/DL — SIGNIFICANT CHANGE UP (ref 8.4–10.5)
CHLORIDE SERPL-SCNC: 109 MMOL/L — HIGH (ref 96–108)
CO2 SERPL-SCNC: 26 MMOL/L — SIGNIFICANT CHANGE UP (ref 22–31)
CREAT SERPL-MCNC: 0.62 MG/DL — SIGNIFICANT CHANGE UP (ref 0.5–1.3)
EGFR: 91 ML/MIN/1.73M2 — SIGNIFICANT CHANGE UP
EOSINOPHIL # BLD AUTO: 0.31 K/UL — SIGNIFICANT CHANGE UP (ref 0–0.5)
EOSINOPHIL NFR BLD AUTO: 5.4 % — SIGNIFICANT CHANGE UP (ref 0–6)
GLUCOSE SERPL-MCNC: 114 MG/DL — HIGH (ref 70–99)
HCT VFR BLD CALC: 38.1 % — SIGNIFICANT CHANGE UP (ref 34.5–45)
HGB BLD-MCNC: 12.3 G/DL — SIGNIFICANT CHANGE UP (ref 11.5–15.5)
IMM GRANULOCYTES NFR BLD AUTO: 0.2 % — SIGNIFICANT CHANGE UP (ref 0–0.9)
LYMPHOCYTES # BLD AUTO: 1.94 K/UL — SIGNIFICANT CHANGE UP (ref 1–3.3)
LYMPHOCYTES # BLD AUTO: 33.6 % — SIGNIFICANT CHANGE UP (ref 13–44)
MCHC RBC-ENTMCNC: 30.3 PG — SIGNIFICANT CHANGE UP (ref 27–34)
MCHC RBC-ENTMCNC: 32.3 GM/DL — SIGNIFICANT CHANGE UP (ref 32–36)
MCV RBC AUTO: 93.8 FL — SIGNIFICANT CHANGE UP (ref 80–100)
MONOCYTES # BLD AUTO: 0.37 K/UL — SIGNIFICANT CHANGE UP (ref 0–0.9)
MONOCYTES NFR BLD AUTO: 6.4 % — SIGNIFICANT CHANGE UP (ref 2–14)
NEUTROPHILS # BLD AUTO: 3.12 K/UL — SIGNIFICANT CHANGE UP (ref 1.8–7.4)
NEUTROPHILS NFR BLD AUTO: 53.9 % — SIGNIFICANT CHANGE UP (ref 43–77)
NRBC # BLD: 0 /100 WBCS — SIGNIFICANT CHANGE UP (ref 0–0)
PLATELET # BLD AUTO: 186 K/UL — SIGNIFICANT CHANGE UP (ref 150–400)
POTASSIUM SERPL-MCNC: 4.2 MMOL/L — SIGNIFICANT CHANGE UP (ref 3.5–5.3)
POTASSIUM SERPL-SCNC: 4.2 MMOL/L — SIGNIFICANT CHANGE UP (ref 3.5–5.3)
RBC # BLD: 4.06 M/UL — SIGNIFICANT CHANGE UP (ref 3.8–5.2)
RBC # FLD: 12.1 % — SIGNIFICANT CHANGE UP (ref 10.3–14.5)
SODIUM SERPL-SCNC: 142 MMOL/L — SIGNIFICANT CHANGE UP (ref 135–145)
WBC # BLD: 5.78 K/UL — SIGNIFICANT CHANGE UP (ref 3.8–10.5)
WBC # FLD AUTO: 5.78 K/UL — SIGNIFICANT CHANGE UP (ref 3.8–10.5)

## 2024-07-23 PROCEDURE — 99233 SBSQ HOSP IP/OBS HIGH 50: CPT | Mod: GC

## 2024-07-23 RX ORDER — MECLIZINE HCL 25 MG
12.5 TABLET ORAL
Refills: 0 | Status: ACTIVE | OUTPATIENT
Start: 2024-07-23 | End: 2025-06-21

## 2024-07-23 RX ORDER — ATORVASTATIN CALCIUM 20 MG/1
10 TABLET, FILM COATED ORAL AT BEDTIME
Refills: 0 | Status: ACTIVE | OUTPATIENT
Start: 2024-07-23 | End: 2025-06-21

## 2024-07-23 RX ADMIN — ESCITALOPRAM OXALATE 10 MILLIGRAM(S): 20 TABLET, FILM COATED ORAL at 13:01

## 2024-07-23 RX ADMIN — Medication 650 MILLIGRAM(S): at 18:30

## 2024-07-23 RX ADMIN — Medication 12.5 MILLIGRAM(S): at 13:04

## 2024-07-23 RX ADMIN — ATORVASTATIN CALCIUM 10 MILLIGRAM(S): 20 TABLET, FILM COATED ORAL at 20:51

## 2024-07-23 RX ADMIN — Medication 2 PUFF(S): at 08:34

## 2024-07-23 RX ADMIN — Medication 2 PUFF(S): at 21:16

## 2024-07-23 RX ADMIN — ENOXAPARIN SODIUM 40 MILLIGRAM(S): 100 INJECTION SUBCUTANEOUS at 17:14

## 2024-07-23 RX ADMIN — Medication 12.5 MILLIGRAM(S): at 20:51

## 2024-07-23 RX ADMIN — Medication 650 MILLIGRAM(S): at 17:57

## 2024-07-23 NOTE — DISCHARGE NOTE PROVIDER - NSDCCONDITION_GEN_ALL_CORE
Stable
Spine appears normal, range of motion is not limited, no muscle or joint tenderness 1+ edema lower extremities

## 2024-07-23 NOTE — DISCHARGE NOTE PROVIDER - NSDCHHBASESERVICE_GEN_ALL_CORE
.  Provider [facility]   Date form sent to provider: 11/ 6/ 2020   Comments REVIEW,DATE AND SIGN      
11.10.2020 FAXED LA FORM TO [facility] [employer] @ [phone] PER AUTHORIZATION FORM.  
Authorization received scanned in chart,   
Date Form Received by Disability:  10/ 20/ 2020  Authorization?  No  Date sent for auth:  10/ 21/ 2020  Date auth returned:    Type of form:  [facility]  Company:  [facility] [employer]  When form complete:  NO AUTHORIZATION YET  Comments [facility] [employer] FAX # [phone]  [employer] message sent to return authorization.   
Doctor reviewed and signed form scanned into signed folder.  
FMLA forms received.  Authorization Form filled out No.  Forms sent to [employer] Disability Dept:  Scanned into New Forms folder  Location of paperwork drop-off: [facility]  Department: Pediatrics Fax    
Forms printed and placed in Doctor mailbox to review and sign.  
Received signed form completion scanned into New folder.  
Physical therapy

## 2024-07-23 NOTE — PHARMACOTHERAPY INTERVENTION NOTE - COMMENTS
Search Terms: makenzie walters, 1945Search Date: 07/23/2024 08:34:18 AM  The Drug Utilization Report below displays all of the controlled substance prescriptions, if any, that your patient has filled in the last twelve months. The information displayed on this report is compiled from pharmacy submissions to the Department, and accurately reflects the information as submitted by the pharmacies.    This report was requested by: Delfina Solomon | Reference #: [id number]    Practitioner Count: 0  Pharmacy Count: 0  Current Opioid Prescriptions: 0  Current Benzodiazepine Prescriptions: 0  Current Stimulant Prescriptions: 0      Patient Demographic Information (PDI)       PDI	First Name	Last Name	Birth Date	Gender	Street Address	[facility]	Zip Code  A	Makenzie Walters	1945	Female	[address] [facility]	[id number]    Prescription Information      PDI Filter:    PDI	Current Rx	Drug Type	Rx Written	Rx Dispensed	Drug	Quantity	Days Supply	Prescriber Name	Prescriber TEODORO #	Payment Method	Dispenser  A	N	B	02/22/2024	02/23/2024	alprazolam 0.25 mg tablet	60	30	Con Looney	[id number]	[facility]  A	N	B	10/10/2023	10/13/2023	alprazolam 0.25 mg tablet	60	30	Con Looney	[id number]	[facility]    * - Details of Drug Type : O = Opioid, B = Benzodiazepine, S = Stimulant    * - Drugs marked with an asterisk are compound drugs. If the compound drug is made up of more than one controlled substance, then each controlled substance will be a separate row in the table.

## 2024-07-23 NOTE — PROGRESS NOTE ADULT - ASSESSMENT
78 yo F with history of Lung ca s/p RLL lobectomy and radiation therapy,  HLD presents to ED for being found down on the floor. Pt admitted for persistent dizziness likely due to marijuana overdose.    #Dizziness likely due to marijuana overdose  - History of lung cancer s/p RLL lobectomy and RT  - Pt states that she had a recent CT chest that showed likely recurrence of the lung cancer and pt felt anxious about the news. States that she had valium, alcohol and then ingested a few marijuana brownies.  - Pt denies any SI/HI. states that she did not intend to harm herself but made the mistake of eating too much marijuana brownies  - CT head unremarkable  - Right hip xray negative  - Psych consult   - cont Lexapro   - unable to log into [facility] to stop the valium prescription due to system issues.     #Asthma  - stable  - cont Symbicort substituting Advair      DVT ppx: lovenox subq      Case discussed with Dr. Angulo  78 yo F with history of Lung ca s/p RLL lobectomy and radiation therapy,  HLD presents to ED for being found down on the floor. Pt admitted for persistent dizziness likely due to marijuana overdose.    #Dizziness likely due to marijuana overdose  - History of lung cancer s/p RLL lobectomy and RT  - Pt states that she had a recent CT chest that showed likely recurrence of the lung cancer and pt felt anxious about the news. States that she had valium, alcohol and then ingested a few marijuana brownies.  - Pt denies any SI/HI. states that she did not intend to harm herself but made the mistake of eating too much marijuana brownies  - CT head unremarkable  - Right hip xray negative  - Cont Lexapro   - PT consult, reassess tomorrow and possible d/c home if feeling better  - F/u orthostatics  - Start meclizine 12.5 mg BID PRN for dizziness      #Asthma  - stable  - cont Symbicort substituting Advair      #HLD  - Patient is on lipitor at home  - Start atorvastatin 10mg     DVT ppx: lovenox subq      Case discussed with Dr. Angulo

## 2024-07-23 NOTE — PROGRESS NOTE ADULT - SUBJECTIVE AND OBJECTIVE BOX
78 yo F with history of Lung ca s/p RLL lobectomy and radiation therapy, asthma, HLD presents with dizziness from marijuana overdose.     Overnight Events: None  Interval HPI: Patient seen and examined at bedside.     REVIEW OF SYSTEMS:  CONSTITUTIONAL: (-) weakness, (-) fevers, (-) chills  EYES/ENT: (-) visual changes,  (-) vertigo,  (-) throat pain   NECK:  (-) pain, (-) stiffness  RESPIRATORY:  (-) shortness of breath, (-) cough,  (-) wheezing,  (-) hemoptysis   CARDIOVASCULAR:  (-) chest pain, (-) palpitations  GASTROINTESTINAL:  (-) abdominal or epigastric pain, (-) nausea, (-) vomiting, (-) diarrhea, (-) constipation, (-) melena,  (-) hematemesis,  (-) hematochezia  GENITOURINARY: (-) dysuria, (-) frequency, (-) hematuria  NEUROLOGICAL: (-) numbness, (-) weakness  SKIN: (-) itching, (-) rashes, (-) lesions    Vital Signs Last 24 Hrs  T(C): 36.4 (23 Jul 2024 05:21), Max: 36.7 (22 Jul 2024 16:58)  T(F): 97.5 (23 Jul 2024 05:21), Max: 98.1 (22 Jul 2024 16:58)  HR: 60 (23 Jul 2024 05:21) (60 - 75)  BP: 139/69 (23 Jul 2024 05:21) (111/67 - 139/69)  BP(mean): --  RR: 18 (23 Jul 2024 05:21) (16 - 18)  SpO2: 95% (23 Jul 2024 05:21) (95% - 98%)    Parameters below as of 23 Jul 2024 05:21  Patient On (Oxygen Delivery Method): room air        PHYSICAL EXAM:  GENERAL: NAD, lying in bed comfortably  HEAD:  Atraumatic, Normocephalic  EYES: EOMI, conjunctiva and sclera clear  ENT: Moist mucous membranes  NECK: Supple, No JVD  CHEST/LUNG: Clear to auscultation bilaterally, good air entry bilaterally; No wheezing, rales, or rhonchi. Unlabored respirations  HEART: Regular rate and rhythm. S1 and S2. No murmurs, rubs, or gallops  ABDOMEN: Soft, Nontender, Nondistended. Bowel sounds present.   EXTREMITIES:  2+ Peripheral Pulses. No clubbing, cyanosis, or edema  NERVOUS SYSTEM:  Alert & Oriented X3, speech clear. No deficits.  MSK: FROM all 4 extremities, full and equal strength  SKIN: No rashes, bruises, or other lesions    LABS:   All Labs Personally Reviewed                         12.6   8.11  )-----------( 212      ( 22 Jul 2024 05:54 )             37.9     07-22    139  |  105  |  17  ----------------------------<  131<H>  4.3   |  30  |  0.92    Ca    8.8      22 Jul 2024 05:54    TPro  6.3  /  Alb  3.3  /  TBili  0.2  /  DBili  x   /  AST  24  /  ALT  34  /  AlkPhos  81  07-22          Blood Culture:   I&O's Summary    22 Jul 2024 07:01  -  23 Jul 2024 07:00  --------------------------------------------------------  IN: 0 mL / OUT: 1600 mL / NET: -1600 mL      CAPILLARY BLOOD GLUCOSE          RADIOLOGY/EKG:  Head CT  < from: CT Head No Cont (07.22.24 @ 07:03) >  IMPRESSION:    1)  unremarkable CT study of the brain  2)  clear sinuses and mastoids..      < end of copied text >    Hip XR  < from: Xray Hip w/ Pelvis 1 View, Right (07.22.24 @ 06:02) >  IMPRESSION:    No evidence of a fracture.    < end of copied text >    ECG  < from: 12 Lead ECG (07.22.24 @ 05:43) >  Ventricular Rate 71 BPM    Atrial Rate 71 BPM    P-R Interval 174 ms    QRS Duration 102 ms    Q-T Interval 386 ms    QTC Calculation(Bazett) 419 ms    P Axis 51 degrees    R Axis -16 degrees    T Axis 41 degrees    Diagnosis Line Normal sinus rhythm    < end of copied text >      MEDICATIONS:  MEDICATIONS  (STANDING):  budesonide  80 MICROgram(s)/formoterol 4.5 MICROgram(s) Inhaler 2 Puff(s) Inhalation two times a day  enoxaparin Injectable 40 milliGRAM(s) SubCutaneous every 24 hours  escitalopram 10 milliGRAM(s) Oral daily     78 yo F with history of Lung ca s/p RLL lobectomy and radiation therapy, asthma, HLD presents with dizziness from marijuana overdose.     Overnight Events: None  Interval HPI: Patient seen and examined at bedside. Patient still reports feeling a bit dizzy when she stands up. Otherwise feeling well. Says she is on atorvastatin, Lexapro, and occasionally meclizine at home, uses [facility] pharmacy but lately has been mail ordering meds.    REVIEW OF SYSTEMS:  CONSTITUTIONAL: (-) weakness, (-) fevers, (-) chills (+) dizziness  EYES/ENT: (-) visual changes,  (-) vertigo,  (-) throat pain  RESPIRATORY:  (-) shortness of breath, (-) cough,  (-) wheezing,  (-) hemoptysis   CARDIOVASCULAR:  (-) chest pain, (-) palpitations  GASTROINTESTINAL:  (-) abdominal or epigastric pain, (-) nausea, (-) vomiting, (-) diarrhea, (-) constipation, (-) melena,  (-) hematemesis,  (-) hematochezia  GENITOURINARY: (-) dysuria, (-) frequency, (-) hematuria    Vital Signs Last 24 Hrs  T(C): 36.4 (23 Jul 2024 05:21), Max: 36.7 (22 Jul 2024 16:58)  T(F): 97.5 (23 Jul 2024 05:21), Max: 98.1 (22 Jul 2024 16:58)  HR: 60 (23 Jul 2024 05:21) (60 - 75)  BP: 139/69 (23 Jul 2024 05:21) (111/67 - 139/69)  BP(mean): --  RR: 18 (23 Jul 2024 05:21) (16 - 18)  SpO2: 95% (23 Jul 2024 05:21) (95% - 98%)    Parameters below as of 23 Jul 2024 05:21  Patient On (Oxygen Delivery Method): room air      PHYSICAL EXAM:  GENERAL: NAD, lying in bed comfortably  CHEST/LUNG: Clear to auscultation bilaterally, good air entry bilaterally; No wheezing, rales, or rhonchi. Unlabored respirations  HEART: Regular rate and rhythm. S1 and S2. No murmurs, rubs, or gallops  ABDOMEN: Soft, Nontender, Nondistended.  EXTREMITIES:  2+ Peripheral Pulses. No clubbing, cyanosis, or edema  NERVOUS SYSTEM:  Alert & Oriented X3, speech clear.      LABS:   All Labs Personally Reviewed                         12.6   8.11  )-----------( 212      ( 22 Jul 2024 05:54 )             37.9     07-22    139  |  105  |  17  ----------------------------<  131<H>  4.3   |  30  |  0.92    Ca    8.8      22 Jul 2024 05:54    TPro  6.3  /  Alb  3.3  /  TBili  0.2  /  DBili  x   /  AST  24  /  ALT  34  /  AlkPhos  81  07-22          Blood Culture:   I&O's Summary    22 Jul 2024 07:01  -  23 Jul 2024 07:00  --------------------------------------------------------  IN: 0 mL / OUT: 1600 mL / NET: -1600 mL      CAPILLARY BLOOD GLUCOSE          RADIOLOGY/EKG:  Head CT  < from: CT Head No Cont (07.22.24 @ 07:03) >  IMPRESSION:    1)  unremarkable CT study of the brain  2)  clear sinuses and mastoids..      < end of copied text >    Hip XR  < from: Xray Hip w/ Pelvis 1 View, Right (07.22.24 @ 06:02) >  IMPRESSION:    No evidence of a fracture.    < end of copied text >    ECG  < from: 12 Lead ECG (07.22.24 @ 05:43) >  Ventricular Rate 71 BPM    Atrial Rate 71 BPM    P-R Interval 174 ms    QRS Duration 102 ms    Q-T Interval 386 ms    QTC Calculation(Bazett) 419 ms    P Axis 51 degrees    R Axis -16 degrees    T Axis 41 degrees    Diagnosis Line Normal sinus rhythm    < end of copied text >      MEDICATIONS:  MEDICATIONS  (STANDING):  budesonide  80 MICROgram(s)/formoterol 4.5 MICROgram(s) Inhaler 2 Puff(s) Inhalation two times a day  enoxaparin Injectable 40 milliGRAM(s) SubCutaneous every 24 hours  escitalopram 10 milliGRAM(s) Oral daily

## 2024-07-23 NOTE — DISCHARGE NOTE PROVIDER - HOSPITAL COURSE
80 yo F with history of Lung ca s/p RLL lobectomy and radiation therapy, asthma, HLD presents with dizziness from marijuana overconsumption. Patient reports consumption of marijuana brownies, valium, and alcohol in the night prior to admission. Patient denied suicidal and homicidal ideation. Right hip x-ray for hip pain was negative for fractures, CT head was negative for acute pathologies. Patient was stable without dizziness or acute complaints, she was able to ambulate on her own. She is medically optimized for discharge home to follow up with her PCP Dr. Aayush Andujar in one week.       Vitals      PE     78 yo F with history of Lung ca s/p RLL lobectomy and radiation therapy, asthma, HLD presents with dizziness from marijuana overconsumption. Patient reports consumption of marijuana brownies, valium, and alcohol in the night prior to admission. Patient denied suicidal and homicidal ideation. Right hip x-ray for hip pain was negative for fractures, CT head was negative for acute pathologies. Patient was stable without dizziness or acute complaints, she was able to ambulate on her own. She is medically optimized for discharge home to follow up with her PCP Dr. Aayush Andujar in one week.     Vital Signs Last 24 Hrs  T(C): 36.4 (23 Jul 2024 05:21), Max: 36.7 (22 Jul 2024 16:58)  T(F): 97.5 (23 Jul 2024 05:21), Max: 98.1 (22 Jul 2024 16:58)  HR: 60 (23 Jul 2024 08:34) (60 - 71)  BP: 139/69 (23 Jul 2024 05:21) (111/67 - 139/69)  BP(mean): --  RR: 18 (23 Jul 2024 05:21) (17 - 18)  SpO2: 95% (23 Jul 2024 08:34) (95% - 95%)    Parameters below as of 23 Jul 2024 08:34  Patient On (Oxygen Delivery Method): room air    GENERAL: NAD, lying in bed comfortably  HEART: Regular rate and rhythm, no murmurs, rubs, or gallops  LUNGS: Unlabored respirations.  Clear to auscultation bilaterally, no crackles, wheezing, or rhonchi  ABDOMEN: Soft, nontender, nondistended  EXTREMITIES: 2+ peripheral pulses bilaterally. No clubbing, cyanosis, or edema  NERVOUS SYSTEM:  A&Ox3, moving all extremities       80 yo F with history of Lung ca s/p RLL lobectomy and radiation therapy, asthma, HLD presents with dizziness from marijuana overconsumption. Patient reports consumption of marijuana brownies, valium, and alcohol in the night prior to admission. Patient denied suicidal and homicidal ideation. Right hip x-ray for hip pain was negative for fractures, CT head was negative for acute pathologies. Patient was stable without dizziness or acute complaints, she was able to ambulate on her own. Home PT was arranged. She is medically optimized for discharge home to follow up with her PCP Dr. Aayush Andujar in one week.

## 2024-07-23 NOTE — PHYSICAL THERAPY INITIAL EVALUATION ADULT - PERTINENT HX OF CURRENT PROBLEM, REHAB EVAL
Patient is a 78 yo F with history of Lung ca s/p RLL lobectomy and radiation therapy,  HLD presents to ED for being found down on the floor. Pt states that she had a CT chest recently showed likely recurrence of the lung cancer and pt felt anxious about the news. Pt states that she had Valium at 4 pm, then had some alcohol, followed by a few marijuana brownies. Pt then felt very unwell, felt dizzy and thinks she passed out and was found on the floor. Pt admits to mild right hip pain. Pt denies suicidal ideation or HI. states that she did not intent to hurt herself,  she ingested too much marijuana brownies unintentionally. no fever, chills, headache, sob, cp, abd pain, nausea, vomiting.  Patient states she currently feels much better, however, still have difficulty getting up due to dizziness.   pelvis xray (-) for fracture

## 2024-07-23 NOTE — DISCHARGE NOTE PROVIDER - NSDCMRMEDTOKEN_GEN_ALL_CORE_FT
Advair Diskus 250 mcg-50 mcg inhalation powder: 1 puff(s) inhaled 2 times a day  albuterol 90 mcg/inh inhalation aerosol: 2 puff(s) inhaled every 4 hours as needed for  shortness of breath and/or wheezing  Lexapro: 10 milligram(s) orally once a day   Advair Diskus 250 mcg-50 mcg inhalation powder: 1 puff(s) inhaled 2 times a day  albuterol 90 mcg/inh inhalation aerosol: 2 puff(s) inhaled every 4 hours as needed for  shortness of breath and/or wheezing  Lexapro: 10 milligram(s) orally once a day  meclizine 12.5 mg oral tablet: 1 tab(s) orally 2 times a day As needed Dizziness

## 2024-07-23 NOTE — DISCHARGE NOTE PROVIDER - CARE PROVIDER_API CALL
Con Looney  [address]  [place] [address]  Phone: [phone]  Fax: [phone]  Established Patient  Follow Up Time: 7-10 Days

## 2024-07-23 NOTE — CHART NOTE - NSCHARTNOTEFT_GEN_A_CORE
Messaged for /85 and patient feeling dizzy.     Patient assessed at bedside. Reports sudden dizziness while lying in bed, called nurse, BP was 169/85. Patient reports a mild headache. No visual changes/disturbance, no nausea or vomiting. No chest pain or SOB. Otherwise normal.       Repeat vitals: /76 Temp 98.2 HR 64    GENERAL: NAD, lying in bed  HEAD:  Atraumatic, normocephalic  HEART: Regular rate and rhythm, no murmurs, rubs, or gallops  LUNGS: Unlabored respirations.  Clear to auscultation bilaterally  ABDOMEN: Soft, nontender, nondistended,   EXTREMITIES: 2+ peripheral pulses bilaterally. No clubbing, cyanosis, or edema  - Mental Status:  AAOx3; speech is fluent  - Cranial Nerves II-XII:    II:  PERRLA; visual fields are full to confrontation  III, IV, VI:  EOMI, no nystagmus  V:  facial sensation is intact in the V1-V3 distribution bilaterally.  VII:  face is symmetric with normal eye closure and smile  XI:  head turning and shoulder shrug are intact bilaterally  XII:  tongue protrudes in the midline  - Motor:  strength is 5/5 throughout    Patient reassured and told to ask for meclizine if needed at night. Will reassess if patient's symptoms worsen. Messaged for /85 and patient feeling dizzy.     Patient assessed at bedside. Reports sudden dizziness while lying in bed, called nurse, BP was 169/85. Patient reports a mild headache. No visual changes/disturbance, no nausea or vomiting. No chest pain or SOB. Otherwise normal.       Repeat vitals: /76 Temp 98.2 HR 64    GENERAL: NAD, lying in bed  HEAD:  Atraumatic, normocephalic  HEART: Regular rate and rhythm, no murmurs, rubs, or gallops  LUNGS: Unlabored respirations.  Clear to auscultation bilaterally  ABDOMEN: Soft, nontender, nondistended,   EXTREMITIES: 2+ peripheral pulses bilaterally. No clubbing, cyanosis, or edema  - Mental Status:  AAOx3; speech is fluent  - Cranial Nerves II-XII:    II:  PERRLA; visual fields are full to confrontation  III, IV, VI:  EOMI, no nystagmus  V:  facial sensation is intact in the V1-V3 distribution bilaterally.  VII:  face is symmetric with normal eye closure and smile  XI:  head turning and shoulder shrug are intact bilaterally  XII:  tongue protrudes in the midline  - Motor:  strength is 5/5 throughout    Patient reassured and told to ask for meclizine if needed at night. Will reassess if patient's symptoms worsen

## 2024-07-23 NOTE — PHYSICAL THERAPY INITIAL EVALUATION ADULT - ADDITIONAL COMMENTS
pt reports she lives with her wife, 3 steps to enter with rail, 1 flight inside with rail, bedroom also accessible on main floor, owns RW and cane but did not need either, reports independent with mobility and ADLs, +, spouse also retired and can assist as needed

## 2024-07-23 NOTE — PROGRESS NOTE ADULT - ATTENDING COMMENTS
78 yo F with history of Lung ca s/p RLL lobectomy and radiation therapy,  HLD presents to ED for being found down on the floor. Pt admitted for persistent dizziness likely due to marijuana overdose.    #Dizziness   - likely due to marijuana overdose  - CT head negative  - psych consult appreciated- no contraindication to discharge  - start meclizine  - follow up orthostatics  - PT consulted- HCPT with RW    discharge home tomorrow

## 2024-07-23 NOTE — PHYSICAL THERAPY INITIAL EVALUATION ADULT - HEALTH SCREEN CRITERIA
Birth Registry interview complete. Signatures were witnessed in person by Birth Registry on the Voluntary Paternity Acknowledgement form and paperwork was collected at this time. Birth Certification Confirmation sheet signed by Birth Registry and sent home with patient.   
yes
Hydroxyzine Pregnancy And Lactation Text: This medication is not safe during pregnancy and should not be taken. It is also excreted in breast milk and breast feeding isn't recommended.

## 2024-07-23 NOTE — PHYSICAL THERAPY INITIAL EVALUATION ADULT - DID THE PATIENT HAVE SURGERY?
"Chief Complaint   Patient presents with     Cough       Initial Pulse 150  Temp(Src) 99.8  F (37.7  C) (Tympanic)  Resp 42  Ht 2' 1.75\" (0.654 m)  Wt 18 lb 6 oz (8.335 kg)  BMI 19.49 kg/m2  HC 17.76\" (45.1 cm)  SpO2 97% Estimated body mass index is 19.49 kg/(m^2) as calculated from the following:    Height as of this encounter: 2' 1.75\" (0.654 m).    Weight as of this encounter: 18 lb 6 oz (8.335 kg).  BP completed using cuff size: NA (Not Taken)    Marlene Walter CMA      " n/a

## 2024-07-23 NOTE — DISCHARGE NOTE PROVIDER - NSDCCPCAREPLAN_GEN_ALL_CORE_FT
PRINCIPAL DISCHARGE DIAGNOSIS  Diagnosis: Dizziness  Assessment and Plan of Treatment:       SECONDARY DISCHARGE DIAGNOSES  Diagnosis: Ambulatory dysfunction  Assessment and Plan of Treatment:      PRINCIPAL DISCHARGE DIAGNOSIS  Diagnosis: Dizziness  Assessment and Plan of Treatment: Resolved. Please do not take further valium until you follow up with PCP. Do not take further edibles. Take Mclizine as needed.      SECONDARY DISCHARGE DIAGNOSES  Diagnosis: Ambulatory dysfunction  Assessment and Plan of Treatment: Home PT was arranged for you.

## 2024-07-24 ENCOUNTER — TRANSCRIPTION ENCOUNTER (OUTPATIENT)
Age: 79
End: 2024-07-24

## 2024-07-24 VITALS
OXYGEN SATURATION: 96 % | DIASTOLIC BLOOD PRESSURE: 85 MMHG | SYSTOLIC BLOOD PRESSURE: 142 MMHG | RESPIRATION RATE: 16 BRPM | TEMPERATURE: 98 F | HEART RATE: 63 BPM

## 2024-07-24 PROCEDURE — 80048 BASIC METABOLIC PNL TOTAL CA: CPT

## 2024-07-24 PROCEDURE — 93005 ELECTROCARDIOGRAM TRACING: CPT

## 2024-07-24 PROCEDURE — 80053 COMPREHEN METABOLIC PANEL: CPT

## 2024-07-24 PROCEDURE — G0378: CPT

## 2024-07-24 PROCEDURE — 96360 HYDRATION IV INFUSION INIT: CPT

## 2024-07-24 PROCEDURE — 94640 AIRWAY INHALATION TREATMENT: CPT

## 2024-07-24 PROCEDURE — 99239 HOSP IP/OBS DSCHRG MGMT >30: CPT

## 2024-07-24 PROCEDURE — 73501 X-RAY EXAM HIP UNI 1 VIEW: CPT

## 2024-07-24 PROCEDURE — 36415 COLL VENOUS BLD VENIPUNCTURE: CPT

## 2024-07-24 PROCEDURE — 84484 ASSAY OF TROPONIN QUANT: CPT

## 2024-07-24 PROCEDURE — 99285 EMERGENCY DEPT VISIT HI MDM: CPT | Mod: 25

## 2024-07-24 PROCEDURE — 85025 COMPLETE CBC W/AUTO DIFF WBC: CPT

## 2024-07-24 PROCEDURE — 96361 HYDRATE IV INFUSION ADD-ON: CPT

## 2024-07-24 PROCEDURE — 70450 CT HEAD/BRAIN W/O DYE: CPT | Mod: MC

## 2024-07-24 RX ORDER — MECLIZINE HCL 25 MG
1 TABLET ORAL
Qty: 0 | Refills: 0 | DISCHARGE
Start: 2024-07-24

## 2024-07-24 RX ADMIN — Medication 12.5 MILLIGRAM(S): at 11:34

## 2024-07-24 RX ADMIN — ESCITALOPRAM OXALATE 10 MILLIGRAM(S): 20 TABLET, FILM COATED ORAL at 11:33

## 2024-07-24 RX ADMIN — Medication 2 PUFF(S): at 09:35

## 2024-07-24 NOTE — PROGRESS NOTE ADULT - ASSESSMENT
78 yo F with history of Lung ca s/p RLL lobectomy and radiation therapy,  HLD presents to ED for being found down on the floor. Pt admitted for persistent dizziness likely due to marijuana overdose.    #Dizziness likely due to marijuana overdose  - History of lung cancer s/p RLL lobectomy and RT  - Pt states that she had a recent CT chest that showed likely recurrence of the lung cancer and pt felt anxious about the news. States that she had valium, alcohol and then ingested a few marijuana brownies.  - Pt denies any SI/HI. states that she did not intend to harm herself but made the mistake of eating too much marijuana brownies  - CT head unremarkable  - Right hip xray negative  - Cont Lexapro   -PT - home PT  -cleared by psych for dc  - meclizine 12.5 mg BID PRN for dizziness (has at home, does not want rx sent)      #Asthma  - stable  - cont Symbicort substituting Advair        DVT ppx: lovenox subq      DC home today. Time spent 40mins

## 2024-07-24 NOTE — DISCHARGE NOTE NURSING/CASE MANAGEMENT/SOCIAL WORK - PATIENT PORTAL LINK FT
You can access the FollowMyHealth Patient Portal offered by [facility] by registering at the following website: [email or web address]. By joining [employer]’s FollowMyHealth portal, you will also be able to view your health information using other applications (apps) compatible with our system.

## 2024-07-24 NOTE — DISCHARGE NOTE NURSING/CASE MANAGEMENT/SOCIAL WORK - NSDCPEFALRISK_GEN_ALL_CORE
For information on Fall & Injury Prevention, visit: [email or web address] OR  [email or web address] OR  https://www.cdc.gov/steadi/patient.html

## 2024-07-24 NOTE — PROGRESS NOTE ADULT - SUBJECTIVE AND OBJECTIVE BOX
78 yo F with history of Lung ca s/p RLL lobectomy and radiation therapy, asthma, HLD presents with dizziness from marijuana overdose.     Overnight Events: None  Interval HPI: Patient seen and examined at bedside. Feels well. Dizziness has improved. SHe walked with walker and feels good. Denies any new concerns and wants to go home today.    REVIEW OF SYSTEMS:  CONSTITUTIONAL: (-) weakness, (-) fevers, (-) chills (+) dizziness  EYES/ENT: (-) visual changes,  (-) vertigo,  (-) throat pain  RESPIRATORY:  (-) shortness of breath, (-) cough,  (-) wheezing,  (-) hemoptysis   CARDIOVASCULAR:  (-) chest pain, (-) palpitations  GASTROINTESTINAL:  (-) abdominal or epigastric pain, (-) nausea, (-) vomiting, (-) diarrhea, (-) constipation, (-) melena,  (-) hematemesis,  (-) hematochezia  GENITOURINARY: (-) dysuria, (-) frequency, (-) hematuria    Vital Signs Last 24 Hrs  T(C): 36.6 (24 Jul 2024 12:38), Max: 36.7 (23 Jul 2024 19:09)  T(F): 97.8 (24 Jul 2024 12:38), Max: 98 (23 Jul 2024 19:09)  HR: 63 (24 Jul 2024 12:38) (58 - 67)  BP: 142/85 (24 Jul 2024 12:38) (126/80 - 169/85)  BP(mean): 113 (23 Jul 2024 17:05) (113 - 113)  RR: 16 (24 Jul 2024 12:38) (16 - 16)  SpO2: 96% (24 Jul 2024 12:38) (96% - 98%)    Parameters below as of 24 Jul 2024 12:38  Patient On (Oxygen Delivery Method): room air        PHYSICAL EXAM:  GENERAL: NAD, lying in bed comfortably  CHEST/LUNG: Clear to auscultation bilaterally, good air entry bilaterally; No wheezing, rales, or rhonchi. Unlabored respirations  HEART: Regular rate and rhythm. S1 and S2. No murmurs, rubs, or gallops  ABDOMEN: Soft, Nontender, Nondistended.  EXTREMITIES:  2+ Peripheral Pulses. No clubbing, cyanosis, or edema  NERVOUS SYSTEM:  Alert & Oriented X3, speech clear.      LABS:   All Labs Personally Reviewed                                    12.3   5.78  )-----------( 186      ( 23 Jul 2024 06:57 )             38.1   07-23    142  |  109<H>  |  11  ----------------------------<  114<H>  4.2   |  26  |  0.62    Ca    8.5      23 Jul 2024 06:57      Blood Culture:   I&O's Summary    22 Jul 2024 07:01  -  23 Jul 2024 07:00  --------------------------------------------------------  IN: 0 mL / OUT: 1600 mL / NET: -1600 mL      CAPILLARY BLOOD GLUCOSE          RADIOLOGY/EKG:  Head CT  < from: CT Head No Cont (07.22.24 @ 07:03) >  IMPRESSION:    1)  unremarkable CT study of the brain  2)  clear sinuses and mastoids..      < end of copied text >    Hip XR  < from: Xray Hip w/ Pelvis 1 View, Right (07.22.24 @ 06:02) >  IMPRESSION:    No evidence of a fracture.    < end of copied text >    ECG  < from: 12 Lead ECG (07.22.24 @ 05:43) >  Ventricular Rate 71 BPM    Atrial Rate 71 BPM    P-R Interval 174 ms    QRS Duration 102 ms    Q-T Interval 386 ms    QTC Calculation(Bazett) 419 ms    P Axis 51 degrees    R Axis -16 degrees    T Axis 41 degrees    Diagnosis Line Normal sinus rhythm    < end of copied text >      MEDICATIONS:  MEDICATIONS  (STANDING):  budesonide  80 MICROgram(s)/formoterol 4.5 MICROgram(s) Inhaler 2 Puff(s) Inhalation two times a day  enoxaparin Injectable 40 milliGRAM(s) SubCutaneous every 24 hours  escitalopram 10 milliGRAM(s) Oral daily

## 2024-07-29 NOTE — H&P ADULT - NSHPROSALLOTHERNEGRD_GEN_ALL_CORE
All other review of systems negative, except as noted in HPI
Follow up with PMD and Orthopedics in 1-2 days.    RICE for Routine Care of Injuries  The routine care of many injuries includes rest, ice, compression, and elevation (RICE therapy). RICE therapy is often recommended for injuries to soft tissues, such as a muscle strain, ligament injuries, bruises, and overuse injuries. It can also be used for some bony injuries. Using RICE therapy can help to relieve pain, lessen swelling, and enable your body to heal.    Rest  Rest is required to allow your body to heal. This usually involves reducing your normal activities and avoiding use of the injured part of your body. Generally, you can return to your normal activities when you are comfortable and have been given permission by your health care provider.    Ice  Image   Icing your injury helps to keep the swelling down, and it lessens pain. Do not apply ice directly to your skin.    Put ice in a plastic bag.  Place a towel between your skin and the bag.  Leave the ice on for 20 minutes, 2–3 times a day.    Do this for as long as you are directed by your health care provider.    Compression  Compression means putting pressure on the injured area. Compression helps to keep swelling down, gives support, and helps with discomfort. Compression may be done with an elastic bandage. If an elastic bandage has been applied, follow these general tips:    Remove and reapply the bandage every 3–4 hours or as directed by your health care provider.  Make sure the bandage is not wrapped too tightly, because this can cut off circulation. If part of your body beyond the bandage becomes blue, numb, cold, swollen, or more painful, your bandage is most likely too tight. If this occurs, remove your bandage and reapply it more loosely.  See your health care provider if the bandage seems to be making your problems worse rather than better.    Elevation  Elevation means keeping the injured area raised. This helps to lessen swelling and decrease pain. If possible, your injured area should be elevated at or above the level of your heart or the center of your chest.    When should I seek medical care?  If your pain and swelling continue.  If your symptoms are getting worse rather than improving.  These symptoms may indicate that further evaluation or further X-rays are needed. Sometimes, X-rays may not show a small broken bone (fracture) until a number of days later. Make a follow-up appointment with your health care provider.    When should I seek immediate medical care?  If you have sudden severe pain at or below the area of your injury.  If you have redness or increased swelling around your injury.  If you have tingling or numbness at or below the area of your injury that does not improve after you

## 2024-09-14 ENCOUNTER — EMERGENCY (EMERGENCY)
Facility: HOSPITAL | Age: 79
LOS: 1 days | Discharge: HOME | End: 2024-09-14
Attending: STUDENT IN AN ORGANIZED HEALTH CARE EDUCATION/TRAINING PROGRAM | Admitting: STUDENT IN AN ORGANIZED HEALTH CARE EDUCATION/TRAINING PROGRAM
Payer: MEDICARE

## 2024-09-14 VITALS
DIASTOLIC BLOOD PRESSURE: 62 MMHG | TEMPERATURE: 98 F | RESPIRATION RATE: 18 BRPM | HEART RATE: 86 BPM | SYSTOLIC BLOOD PRESSURE: 124 MMHG | HEIGHT: 64 IN | WEIGHT: 136.91 LBS | OXYGEN SATURATION: 96 %

## 2024-09-14 DIAGNOSIS — Z98.890 OTHER SPECIFIED POSTPROCEDURAL STATES: Chronic | ICD-10-CM

## 2024-09-14 PROCEDURE — 99283 EMERGENCY DEPT VISIT LOW MDM: CPT | Mod: 25

## 2024-09-14 PROCEDURE — 99284 EMERGENCY DEPT VISIT MOD MDM: CPT

## 2024-09-14 PROCEDURE — 90471 IMMUNIZATION ADMIN: CPT

## 2024-09-14 PROCEDURE — 90715 TDAP VACCINE 7 YRS/> IM: CPT

## 2024-09-14 RX ORDER — TETANUS TOXOID, REDUCED DIPHTHERIA TOXOID AND ACELLULAR PERTUSSIS VACCINE, ADSORBED 5; 2.5; 8; 8; 2.5 [IU]/.5ML; [IU]/.5ML; UG/.5ML; UG/.5ML; UG/.5ML
0.5 SUSPENSION INTRAMUSCULAR ONCE
Refills: 0 | Status: COMPLETED | OUTPATIENT
Start: 2024-09-14 | End: 2024-09-14

## 2024-09-14 RX ORDER — SULFAMETHOXAZOLE AND TRIMETHOPRIM 800; 160 MG/1; MG/1
1 TABLET ORAL
Qty: 14 | Refills: 0
Start: 2024-09-14 | End: 2024-09-20

## 2024-09-14 RX ORDER — CEPHALEXIN 500 MG
1 CAPSULE ORAL
Qty: 28 | Refills: 0
Start: 2024-09-14 | End: 2024-09-20

## 2024-09-14 RX ADMIN — TETANUS TOXOID, REDUCED DIPHTHERIA TOXOID AND ACELLULAR PERTUSSIS VACCINE, ADSORBED 0.5 MILLILITER(S): 5; 2.5; 8; 8; 2.5 SUSPENSION INTRAMUSCULAR at 13:36

## 2024-09-14 NOTE — ED PROVIDER NOTE - CLINICAL SUMMARY MEDICAL DECISION MAKING FREE TEXT BOX
78 yo f ho  Lung ca s/p RLL lobectomy and radiation therapy in remission, asthma, HLD Presents with left foot pain and swelling.  Patient admits about 2 days ago possibly had a bug bite to her left leg and since then has been having some redness and swelling over her foot.  Able to ambulate but with a little bit of pain.  Denies any fevers chills numbness tingling weakness  Exam as stated  Concern for bug bite/spider bite. Will update tdap and txt with abx. Strict return precautions given  Patient to be discharged from ED. Any available test results were discussed with patient and/or family. Verbal instructions given, including instructions to return to ED immediately for any new, worsening, or concerning symptoms. Patient endorsed understanding. Written discharge instructions additionally given, including follow-up plan.

## 2024-09-14 NOTE — ED PROVIDER NOTE - PATIENT PORTAL LINK FT
You can access the FollowMyHealth Patient Portal offered by [facility] by registering at the following website: [email or web address]. By joining [facility]s FollowMyHealth portal, you will also be able to view your health information using other applications (apps) compatible with our system.

## 2024-09-14 NOTE — ED ADULT TRIAGE NOTE - CHIEF COMPLAINT QUOTE
Patient complaint of bite to L foot on Wednesday and noticed redness to the area. Denies any fever or chills

## 2024-09-14 NOTE — ED PROVIDER NOTE - PHYSICAL EXAMINATION
CONSTITUTIONAL: NAD  SKIN: Warm dry  HEAD: NCAT  EYES: NL inspection  ENT: MMM  EXT: no pedal edema  +LT foot pinpoint scab cw with bite over lateral dorsal foot with surrounding erythema, BL DP intact    NEURO: Grossly unremarkable  PSYCH: Cooperative, appropriate.

## 2024-09-14 NOTE — ED PROVIDER NOTE - CARE PROVIDER_API CALL
Con Looney  [address]  [place] [address]  Phone: [phone]  Fax: [phone]  Follow Up Time: 7-10 Days

## 2024-09-14 NOTE — ED PROVIDER NOTE - OBJECTIVE STATEMENT
80 yo f ho  Lung ca s/p RLL lobectomy and radiation therapy in remission, asthma, HLD Presents with left foot pain and swelling.  Patient admits about 2 days ago possibly had a bug bite to her left leg and since then has been having some redness and swelling over her foot.  Able to ambulate but with a little bit of pain.  Denies any fevers chills numbness tingling weakness

## 2024-09-19 NOTE — CHART NOTE - NSCHARTNOTEFT_GEN_A_CORE
79 y o female presenting to the ED on 9/15/24 complaining of insect bite as per chart.  ED schedule coordinator made a courtesy call to assist with scheduling the recommended primary care follow up appointment and received no answer.  Contact information was provided via voicemail.

## 2024-10-28 ENCOUNTER — NON-APPOINTMENT (OUTPATIENT)
Age: 79
End: 2024-10-28

## 2025-01-09 ENCOUNTER — APPOINTMENT (OUTPATIENT)
Dept: MAMMOGRAPHY | Facility: HOSPITAL | Age: 80
End: 2025-01-09
Payer: MEDICARE

## 2025-01-09 ENCOUNTER — OUTPATIENT (OUTPATIENT)
Dept: OUTPATIENT SERVICES | Facility: HOSPITAL | Age: 80
LOS: 1 days | End: 2025-01-09
Payer: MEDICARE

## 2025-01-09 ENCOUNTER — APPOINTMENT (OUTPATIENT)
Dept: ULTRASOUND IMAGING | Facility: HOSPITAL | Age: 80
End: 2025-01-09
Payer: MEDICARE

## 2025-01-09 DIAGNOSIS — Z98.890 OTHER SPECIFIED POSTPROCEDURAL STATES: Chronic | ICD-10-CM

## 2025-01-09 DIAGNOSIS — Z12.31 ENCOUNTER FOR SCREENING MAMMOGRAM FOR MALIGNANT NEOPLASM OF BREAST: ICD-10-CM

## 2025-01-09 PROCEDURE — G0279: CPT

## 2025-01-09 PROCEDURE — G0279: CPT | Mod: 26

## 2025-01-09 PROCEDURE — 76641 ULTRASOUND BREAST COMPLETE: CPT

## 2025-01-09 PROCEDURE — 77065 DX MAMMO INCL CAD UNI: CPT | Mod: 26,LT

## 2025-01-09 PROCEDURE — 77065 DX MAMMO INCL CAD UNI: CPT

## 2025-01-09 PROCEDURE — 76641 ULTRASOUND BREAST COMPLETE: CPT | Mod: 26,LT,GA

## 2025-02-11 ENCOUNTER — EMERGENCY (EMERGENCY)
Facility: HOSPITAL | Age: 80
LOS: 1 days | Discharge: HOME | End: 2025-02-11
Attending: STUDENT IN AN ORGANIZED HEALTH CARE EDUCATION/TRAINING PROGRAM | Admitting: STUDENT IN AN ORGANIZED HEALTH CARE EDUCATION/TRAINING PROGRAM
Payer: MEDICARE

## 2025-02-11 VITALS
OXYGEN SATURATION: 95 % | DIASTOLIC BLOOD PRESSURE: 63 MMHG | HEART RATE: 86 BPM | WEIGHT: 143.08 LBS | SYSTOLIC BLOOD PRESSURE: 129 MMHG | RESPIRATION RATE: 16 BRPM | TEMPERATURE: 100 F | HEIGHT: 64 IN

## 2025-02-11 DIAGNOSIS — Z98.890 OTHER SPECIFIED POSTPROCEDURAL STATES: Chronic | ICD-10-CM

## 2025-02-11 LAB
FLUAV AG NPH QL: DETECTED
FLUBV AG NPH QL: SIGNIFICANT CHANGE UP
RSV RNA NPH QL NAA+NON-PROBE: SIGNIFICANT CHANGE UP
SARS-COV-2 RNA SPEC QL NAA+PROBE: SIGNIFICANT CHANGE UP

## 2025-02-11 PROCEDURE — 71046 X-RAY EXAM CHEST 2 VIEWS: CPT | Mod: 26

## 2025-02-11 PROCEDURE — 93005 ELECTROCARDIOGRAM TRACING: CPT

## 2025-02-11 PROCEDURE — 87637 SARSCOV2&INF A&B&RSV AMP PRB: CPT

## 2025-02-11 PROCEDURE — 71046 X-RAY EXAM CHEST 2 VIEWS: CPT

## 2025-02-11 PROCEDURE — 93010 ELECTROCARDIOGRAM REPORT: CPT

## 2025-02-11 PROCEDURE — 0241U: CPT

## 2025-02-11 PROCEDURE — 99284 EMERGENCY DEPT VISIT MOD MDM: CPT

## 2025-02-11 PROCEDURE — 94640 AIRWAY INHALATION TREATMENT: CPT

## 2025-02-11 PROCEDURE — 99285 EMERGENCY DEPT VISIT HI MDM: CPT | Mod: 25

## 2025-02-11 RX ORDER — PREDNISONE 5 MG/1
1 TABLET ORAL
Qty: 5 | Refills: 0
Start: 2025-02-11 | End: 2025-02-15

## 2025-02-11 RX ORDER — OSELTAMIVIR PHOSPHATE 75 MG/1
1 CAPSULE ORAL
Qty: 10 | Refills: 0
Start: 2025-02-11 | End: 2025-02-15

## 2025-02-11 RX ORDER — OXYMETAZOLINE HYDROCHLORIDE 0.05 G/100ML
1 SPRAY NASAL ONCE
Refills: 0 | Status: COMPLETED | OUTPATIENT
Start: 2025-02-11 | End: 2025-02-11

## 2025-02-11 RX ORDER — PREDNISONE 5 MG/1
20 TABLET ORAL ONCE
Refills: 0 | Status: COMPLETED | OUTPATIENT
Start: 2025-02-11 | End: 2025-02-11

## 2025-02-11 RX ORDER — ACETAMINOPHEN 160 MG/5ML
650 SUSPENSION ORAL ONCE
Refills: 0 | Status: COMPLETED | OUTPATIENT
Start: 2025-02-11 | End: 2025-02-11

## 2025-02-11 RX ORDER — AZITHROMYCIN DIHYDRATE 500 MG/1
1 TABLET, FILM COATED ORAL
Qty: 5 | Refills: 0
Start: 2025-02-11 | End: 2025-02-15

## 2025-02-11 RX ORDER — IPRATROPIUM BROMIDE AND ALBUTEROL SULFATE .5; 2.5 MG/3ML; MG/3ML
3 SOLUTION RESPIRATORY (INHALATION)
Refills: 0 | Status: ACTIVE | OUTPATIENT
Start: 2025-02-11 | End: 2025-02-11

## 2025-02-11 RX ADMIN — IPRATROPIUM BROMIDE AND ALBUTEROL SULFATE 3 MILLILITER(S): .5; 2.5 SOLUTION RESPIRATORY (INHALATION) at 08:37

## 2025-02-11 RX ADMIN — ACETAMINOPHEN 650 MILLIGRAM(S): 160 SUSPENSION ORAL at 08:37

## 2025-02-11 RX ADMIN — PREDNISONE 20 MILLIGRAM(S): 5 TABLET ORAL at 08:37

## 2025-02-11 RX ADMIN — Medication 100 MILLIGRAM(S): at 08:36

## 2025-02-11 RX ADMIN — OXYMETAZOLINE HYDROCHLORIDE 1 SPRAY(S): 0.05 SPRAY NASAL at 08:36

## 2025-02-11 NOTE — ED PROVIDER NOTE - CLINICAL SUMMARY MEDICAL DECISION MAKING FREE TEXT BOX
79-year-old female with history of lung cancer status post lung resection, not currently on chemotherapy or radiation, asthma presented to the ED with reported flulike symptoms for the past 4 days reports nasal congestion cough with slight phlegm, patient also reports some associated body aches, patient is otherwise well-appearing, bilateral wheezing noted, given DuoNebs and prednisone will with improvement, noted interlobar fissure fluid on chest x-ray, patient tested positive for influenza A, will send Tamiflu given her history of asthma and lung cancer, patient has nebulizer machine at home, instructed on use every 4-6 hours as needed, will send prednisone, Tessalon Perles in addition for supportive care, drink plenty fluids Tylenol for fever, return precautions discussed verbalized understanding and agreeable with discharge plan.

## 2025-02-11 NOTE — ED PROVIDER NOTE - PATIENT PORTAL LINK FT
You can access the FollowMyHealth Patient Portal offered by [facility] by registering at the following website: [email or web address]. By joining [facility]’s FollowMyHealth portal, you will also be able to view your health information using other applications (apps) compatible with our system.

## 2025-02-11 NOTE — ED ADULT NURSE NOTE - CHIEF COMPLAINT QUOTE
Pt has flu like symptoms for 4 days.  Pt returned from cruise to [facility] 2 days ago.  PCP Aayush.

## 2025-02-11 NOTE — ED ADULT TRIAGE NOTE - CHIEF COMPLAINT QUOTE
Pt has flu like symptoms for 4 days.  Pt returned from cruise to [facility] 2 days ago.  PCP Aayush.

## 2025-02-11 NOTE — ED PROVIDER NOTE - PHYSICAL EXAMINATION
VITAL SIGNS: I have reviewed nursing notes and confirm.  CONSTITUTIONAL: well-appearing, non-toxic, NAD  SKIN: Warm dry, normal skin turgor  HEAD: NCAT  EYES: EOMI, PERRLA, no scleral icterus  ENT: Moist mucous membranes, normal pharynx with no erythema or exudates  NECK: Supple; non tender. Full ROM. No cervical LAD  CARD: RRR, no murmurs, rubs or gallops  RESP: b/l wheezing, normal WOB   ABD: soft, + BS, non-tender, non-distended, no rebound or guarding. No CVA tenderness  EXT: Full ROM, no bony tenderness, no pedal edema, no calf tenderness  NEURO: normal motor. normal sensory. Normal gait.  PSYCH: Cooperative, appropriate.

## 2025-02-15 NOTE — CHART NOTE - NSCHARTNOTEFT_GEN_A_CORE
SW called pt to discuss and assist with follow up care.  Pt is a 78 y/o female presented to ED for flu like symptoms. Pt reported that pt is feeling better and declined SW assistance with an appt.

## 2025-05-17 ENCOUNTER — NON-APPOINTMENT (OUTPATIENT)
Age: 80
End: 2025-05-17

## 2025-05-19 ENCOUNTER — APPOINTMENT (OUTPATIENT)
Dept: OBGYN | Facility: CLINIC | Age: 80
End: 2025-05-19
Payer: MEDICARE

## 2025-05-19 VITALS
DIASTOLIC BLOOD PRESSURE: 64 MMHG | HEART RATE: 73 BPM | HEIGHT: 64 IN | OXYGEN SATURATION: 96 % | TEMPERATURE: 96.9 F | BODY MASS INDEX: 24.07 KG/M2 | SYSTOLIC BLOOD PRESSURE: 122 MMHG | WEIGHT: 141 LBS

## 2025-05-19 DIAGNOSIS — N95.2 POSTMENOPAUSAL ATROPHIC VAGINITIS: ICD-10-CM

## 2025-05-19 DIAGNOSIS — R92.30 DENSE BREASTS, UNSPECIFIED: ICD-10-CM

## 2025-05-19 DIAGNOSIS — Z12.39 ENCOUNTER FOR OTHER SCREENING FOR MALIGNANT NEOPLASM OF BREAST: ICD-10-CM

## 2025-05-19 DIAGNOSIS — R10.2 PELVIC AND PERINEAL PAIN: ICD-10-CM

## 2025-05-19 PROCEDURE — G0101: CPT

## 2025-05-19 PROCEDURE — 99212 OFFICE O/P EST SF 10 MIN: CPT | Mod: 25

## 2025-05-19 PROCEDURE — 99397 PER PM REEVAL EST PAT 65+ YR: CPT | Mod: GY

## 2025-05-20 LAB
C TRACH RRNA SPEC QL NAA+PROBE: NOT DETECTED
N GONORRHOEA RRNA SPEC QL NAA+PROBE: NOT DETECTED
SOURCE AMPLIFICATION: NORMAL

## 2025-05-21 LAB — HPV HIGH+LOW RISK DNA PNL CVX: NOT DETECTED

## 2025-05-22 LAB — CYTOLOGY CVX/VAG DOC THIN PREP: NORMAL

## 2025-06-10 ENCOUNTER — APPOINTMENT (OUTPATIENT)
Dept: ORTHOPEDIC SURGERY | Facility: CLINIC | Age: 80
End: 2025-06-10
Payer: MEDICARE

## 2025-06-10 VITALS — WEIGHT: 141 LBS | BODY MASS INDEX: 24.07 KG/M2 | HEIGHT: 64 IN

## 2025-06-10 PROCEDURE — 73564 X-RAY EXAM KNEE 4 OR MORE: CPT | Mod: LT

## 2025-06-10 PROCEDURE — 20610 DRAIN/INJ JOINT/BURSA W/O US: CPT | Mod: LT

## 2025-06-10 PROCEDURE — 99213 OFFICE O/P EST LOW 20 MIN: CPT | Mod: 25

## 2025-06-11 ENCOUNTER — APPOINTMENT (OUTPATIENT)
Dept: MAMMOGRAPHY | Facility: HOSPITAL | Age: 80
End: 2025-06-11
Payer: MEDICARE

## 2025-06-11 ENCOUNTER — OUTPATIENT (OUTPATIENT)
Dept: OUTPATIENT SERVICES | Facility: HOSPITAL | Age: 80
LOS: 1 days | End: 2025-06-11
Payer: MEDICARE

## 2025-06-11 ENCOUNTER — APPOINTMENT (OUTPATIENT)
Dept: ULTRASOUND IMAGING | Facility: HOSPITAL | Age: 80
End: 2025-06-11
Payer: MEDICARE

## 2025-06-11 DIAGNOSIS — Z98.890 OTHER SPECIFIED POSTPROCEDURAL STATES: Chronic | ICD-10-CM

## 2025-06-11 DIAGNOSIS — Z12.31 ENCOUNTER FOR SCREENING MAMMOGRAM FOR MALIGNANT NEOPLASM OF BREAST: ICD-10-CM

## 2025-06-11 PROCEDURE — 77067 SCR MAMMO BI INCL CAD: CPT | Mod: 26

## 2025-06-11 PROCEDURE — 77063 BREAST TOMOSYNTHESIS BI: CPT | Mod: 26

## 2025-06-11 PROCEDURE — 76641 ULTRASOUND BREAST COMPLETE: CPT | Mod: 26,50,GA

## 2025-06-11 PROCEDURE — 77063 BREAST TOMOSYNTHESIS BI: CPT

## 2025-06-11 PROCEDURE — 76641 ULTRASOUND BREAST COMPLETE: CPT

## 2025-06-11 PROCEDURE — 77067 SCR MAMMO BI INCL CAD: CPT

## 2025-06-16 DIAGNOSIS — R92.8 OTHER ABNORMAL AND INCONCLUSIVE FINDINGS ON DIAGNOSTIC IMAGING OF BREAST: ICD-10-CM

## 2025-07-29 ENCOUNTER — APPOINTMENT (OUTPATIENT)
Dept: MAMMOGRAPHY | Facility: HOSPITAL | Age: 80
End: 2025-07-29
Payer: MEDICARE

## 2025-07-29 ENCOUNTER — APPOINTMENT (OUTPATIENT)
Dept: ULTRASOUND IMAGING | Facility: HOSPITAL | Age: 80
End: 2025-07-29
Payer: MEDICARE

## 2025-07-29 ENCOUNTER — OUTPATIENT (OUTPATIENT)
Dept: OUTPATIENT SERVICES | Facility: HOSPITAL | Age: 80
LOS: 1 days | End: 2025-07-29
Payer: MEDICARE

## 2025-07-29 DIAGNOSIS — Z00.8 ENCOUNTER FOR OTHER GENERAL EXAMINATION: ICD-10-CM

## 2025-07-29 DIAGNOSIS — Z98.890 OTHER SPECIFIED POSTPROCEDURAL STATES: Chronic | ICD-10-CM

## 2025-07-29 PROCEDURE — 76641 ULTRASOUND BREAST COMPLETE: CPT | Mod: 26,LT,GA

## 2025-07-29 PROCEDURE — 77065 DX MAMMO INCL CAD UNI: CPT | Mod: 26,LT

## 2025-07-29 PROCEDURE — 77065 DX MAMMO INCL CAD UNI: CPT

## 2025-07-29 PROCEDURE — G0279: CPT | Mod: 26

## 2025-07-29 PROCEDURE — 76641 ULTRASOUND BREAST COMPLETE: CPT

## 2025-07-29 PROCEDURE — G0279: CPT

## 2025-08-15 ENCOUNTER — NON-APPOINTMENT (OUTPATIENT)
Age: 80
End: 2025-08-15

## 2025-08-22 ENCOUNTER — EMERGENCY (EMERGENCY)
Facility: HOSPITAL | Age: 80
LOS: 1 days | End: 2025-08-22
Attending: EMERGENCY MEDICINE | Admitting: EMERGENCY MEDICINE
Payer: MEDICARE

## 2025-08-22 VITALS
SYSTOLIC BLOOD PRESSURE: 129 MMHG | RESPIRATION RATE: 18 BRPM | TEMPERATURE: 98 F | DIASTOLIC BLOOD PRESSURE: 70 MMHG | HEIGHT: 64.5 IN | WEIGHT: 143.08 LBS | OXYGEN SATURATION: 95 % | HEART RATE: 77 BPM

## 2025-08-22 DIAGNOSIS — Z98.890 OTHER SPECIFIED POSTPROCEDURAL STATES: Chronic | ICD-10-CM

## 2025-08-22 PROCEDURE — L9991: CPT

## 2025-09-01 ENCOUNTER — EMERGENCY (EMERGENCY)
Facility: HOSPITAL | Age: 80
LOS: 1 days | End: 2025-09-01
Attending: STUDENT IN AN ORGANIZED HEALTH CARE EDUCATION/TRAINING PROGRAM | Admitting: STUDENT IN AN ORGANIZED HEALTH CARE EDUCATION/TRAINING PROGRAM
Payer: MEDICARE

## 2025-09-01 VITALS
OXYGEN SATURATION: 97 % | HEART RATE: 81 BPM | TEMPERATURE: 98 F | SYSTOLIC BLOOD PRESSURE: 123 MMHG | WEIGHT: 143.08 LBS | HEIGHT: 64.5 IN | RESPIRATION RATE: 15 BRPM | DIASTOLIC BLOOD PRESSURE: 75 MMHG

## 2025-09-01 DIAGNOSIS — Z98.890 OTHER SPECIFIED POSTPROCEDURAL STATES: Chronic | ICD-10-CM

## 2025-09-01 PROCEDURE — 99284 EMERGENCY DEPT VISIT MOD MDM: CPT

## 2025-09-01 PROCEDURE — 99282 EMERGENCY DEPT VISIT SF MDM: CPT | Mod: 25

## 2025-09-01 PROCEDURE — 12001 RPR S/N/AX/GEN/TRNK 2.5CM/<: CPT

## 2025-09-01 RX ORDER — BACITRACIN 500 UNIT/G
1 OINTMENT (GRAM) TOPICAL ONCE
Refills: 0 | Status: COMPLETED | OUTPATIENT
Start: 2025-09-01 | End: 2025-09-01

## 2025-09-01 RX ADMIN — Medication 1 APPLICATION(S): at 16:27

## 2025-09-12 ENCOUNTER — EMERGENCY (EMERGENCY)
Facility: HOSPITAL | Age: 80
LOS: 1 days | End: 2025-09-12
Attending: EMERGENCY MEDICINE | Admitting: EMERGENCY MEDICINE
Payer: MEDICARE

## 2025-09-12 VITALS
HEART RATE: 69 BPM | TEMPERATURE: 97 F | DIASTOLIC BLOOD PRESSURE: 85 MMHG | WEIGHT: 143.08 LBS | SYSTOLIC BLOOD PRESSURE: 139 MMHG | RESPIRATION RATE: 16 BRPM | HEIGHT: 64.5 IN | OXYGEN SATURATION: 95 %

## 2025-09-12 DIAGNOSIS — Z98.890 OTHER SPECIFIED POSTPROCEDURAL STATES: Chronic | ICD-10-CM

## 2025-09-12 PROCEDURE — 99284 EMERGENCY DEPT VISIT MOD MDM: CPT | Mod: 25

## 2025-09-12 PROCEDURE — 72170 X-RAY EXAM OF PELVIS: CPT

## 2025-09-12 PROCEDURE — 72192 CT PELVIS W/O DYE: CPT | Mod: 26

## 2025-09-12 PROCEDURE — 72170 X-RAY EXAM OF PELVIS: CPT | Mod: 26,XE

## 2025-09-12 PROCEDURE — 99285 EMERGENCY DEPT VISIT HI MDM: CPT

## 2025-09-12 PROCEDURE — 72192 CT PELVIS W/O DYE: CPT

## 2025-09-12 PROCEDURE — 73502 X-RAY EXAM HIP UNI 2-3 VIEWS: CPT

## 2025-09-12 PROCEDURE — 73502 X-RAY EXAM HIP UNI 2-3 VIEWS: CPT | Mod: 26,RT

## 2025-09-12 RX ORDER — ACETAMINOPHEN 500 MG/5ML
650 LIQUID (ML) ORAL ONCE
Refills: 0 | Status: COMPLETED | OUTPATIENT
Start: 2025-09-12 | End: 2025-09-12

## 2025-09-12 RX ORDER — TRAMADOL HYDROCHLORIDE 50 MG/1
1 TABLET, FILM COATED ORAL
Qty: 9 | Refills: 0
Start: 2025-09-12 | End: 2025-09-14

## 2025-09-12 RX ADMIN — Medication 650 MILLIGRAM(S): at 11:25

## 2025-09-12 RX ADMIN — Medication 650 MILLIGRAM(S): at 12:08
